# Patient Record
Sex: MALE | Race: WHITE | NOT HISPANIC OR LATINO | Employment: FULL TIME | URBAN - METROPOLITAN AREA
[De-identification: names, ages, dates, MRNs, and addresses within clinical notes are randomized per-mention and may not be internally consistent; named-entity substitution may affect disease eponyms.]

---

## 2017-10-25 ENCOUNTER — OFFICE VISIT (OUTPATIENT)
Dept: URGENT CARE | Facility: PHYSICIAN GROUP | Age: 27
End: 2017-10-25
Payer: COMMERCIAL

## 2017-10-25 VITALS
SYSTOLIC BLOOD PRESSURE: 128 MMHG | HEART RATE: 80 BPM | DIASTOLIC BLOOD PRESSURE: 78 MMHG | HEIGHT: 75 IN | BODY MASS INDEX: 16.16 KG/M2 | WEIGHT: 130 LBS | TEMPERATURE: 98.7 F | OXYGEN SATURATION: 98 % | RESPIRATION RATE: 12 BRPM

## 2017-10-25 DIAGNOSIS — R21 RASH: ICD-10-CM

## 2017-10-25 DIAGNOSIS — L08.9 INFECTED ABRASION OF LEFT HAND, INITIAL ENCOUNTER: ICD-10-CM

## 2017-10-25 DIAGNOSIS — S60.512A INFECTED ABRASION OF LEFT HAND, INITIAL ENCOUNTER: ICD-10-CM
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
## 2017-10-25 PROCEDURE — 99214 OFFICE O/P EST MOD 30 MIN: CPT | Performed by: PHYSICIAN ASSISTANT

## 2017-10-25 RX ORDER — SULFAMETHOXAZOLE AND TRIMETHOPRIM 800; 160 MG/1; MG/1
1 TABLET ORAL 2 TIMES DAILY
Qty: 20 TAB | Refills: 0 | Status: SHIPPED | OUTPATIENT
Start: 2017-10-25 | End: 2018-10-17

## 2017-10-25 ASSESSMENT — ENCOUNTER SYMPTOMS: FEVER: 0

## 2017-10-25 NOTE — PATIENT INSTRUCTIONS
Rash  A rash is a change in the color or texture of the skin. There are many different types of rashes. You may have other problems that accompany your rash.  CAUSES   · Infections.  · Allergic reactions. This can include allergies to pets or foods.  · Certain medicines.  · Exposure to certain chemicals, soaps, or cosmetics.  · Heat.  · Exposure to poisonous plants.  · Tumors, both cancerous and noncancerous.  SYMPTOMS   · Redness.  · Scaly skin.  · Itchy skin.  · Dry or cracked skin.  · Bumps.  · Blisters.  · Pain.  DIAGNOSIS   Your caregiver may do a physical exam to determine what type of rash you have. A skin sample (biopsy) may be taken and examined under a microscope.  TREATMENT   Treatment depends on the type of rash you have. Your caregiver may prescribe certain medicines. For serious conditions, you may need to see a skin doctor (dermatologist).  HOME CARE INSTRUCTIONS   · Avoid the substance that caused your rash.  · Do not scratch your rash. This can cause infection.  · You may take cool baths to help stop itching.  · Only take over-the-counter or prescription medicines as directed by your caregiver.  · Keep all follow-up appointments as directed by your caregiver.  SEEK IMMEDIATE MEDICAL CARE IF:  · You have increasing pain, swelling, or redness.  · You have a fever.  · You have new or severe symptoms.  · You have body aches, diarrhea, or vomiting.  · Your rash is not better after 3 days.  MAKE SURE YOU:  · Understand these instructions.  · Will watch your condition.  · Will get help right away if you are not doing well or get worse.     This information is not intended to replace advice given to you by your health care provider. Make sure you discuss any questions you have with your health care provider.     Document Released: 12/08/2003 Document Revised: 01/08/2016 Document Reviewed: 10/01/2012  [employer] Interactive Patient Education ©2016 [employer]

## 2017-10-25 NOTE — PROGRESS NOTES
"Subjective:      Aristides Kang is a 27 y.o. male who presents with Hand Pain (left hand swelling x this AM )    PMH:  has no past medical history on file.  MEDS:   Current Outpatient Prescriptions:   •  hydrocodone-acetaminophen (NORCO) 5-325 MG TABS per tablet, Take 1-2 Tabs by mouth every 6 hours as needed., Disp: 15 Tab, Rfl: 0  ALLERGIES: No Known Allergies  SURGHX: History reviewed. No pertinent surgical history.  SOCHX:  reports that he has been smoking Cigarettes.  He has smoked for the past 4.00 years. He does not have any smokeless tobacco history on file. He reports that he drinks alcohol. He reports that he does not use drugs.  FH: Reviewed with patient/family. Not pertinent to this complaint.          PT co multiple insect bites to arms and legs, sparing his trunk.  PT states he noticed these itchy bumps that look like bites a few days ago.  He woke up this morning and his left hand is swollen and slightly red, not particularly painful, feels more full and tight than painful.  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   PT denies any other complaint.       Rash   This is a new problem. The current episode started in the past 7 days. The problem has been gradually worsening since onset. The affected locations include the left lower leg, right lower leg, left arm, right arm and left hand. The rash is characterized by redness and itchiness. He was exposed to an insect bite/sting. Pertinent negatives include no fever. Past treatments include nothing. The treatment provided no relief. There is no history of eczema.       Review of Systems   Constitutional: Negative for fever.   Skin: Positive for itching and rash.   All other systems reviewed and are negative.         Objective:     /78   Pulse 80   Temp 37.1 °C (98.7 °F)   Resp 12   Ht 1.905 m (6' 3\")   Wt 59 kg (130 lb)   SpO2 98%   BMI 16.25 kg/m²  "     Physical Exam   Constitutional: He is oriented to person, place, and time. He appears well-developed and well-nourished. No distress.   HENT:   Head: Normocephalic and atraumatic.   Nose: Nose normal.   Eyes: Conjunctivae and EOM are normal. Pupils are equal, round, and reactive to light.   Neck: Normal range of motion.   Cardiovascular: Normal rate and intact distal pulses.    Pulmonary/Chest: Effort normal.   Musculoskeletal: Normal range of motion.   Neurological: He is alert and oriented to person, place, and time.   Skin: Skin is warm and dry. Capillary refill takes less than 2 seconds. Rash noted.        Psychiatric: He has a normal mood and affect.   Nursing note and vitals reviewed.              Assessment/Plan:     1. Infected abrasion of left hand, initial encounter  sulfamethoxazole-trimethoprim (BACTRIM DS) 800-160 MG tablet    permethrin (ELIMITE) 1 % lotion   2. Multiple insect bites  sulfamethoxazole-trimethoprim (BACTRIM DS) 800-160 MG tablet    permethrin (ELIMITE) 1 % lotion   3. Rash  sulfamethoxazole-trimethoprim (BACTRIM DS) 800-160 MG tablet    permethrin (ELIMITE) 1 % lotion     PT rash seems consistent with pattern of scabies and they are extremely itchy, so will treat with permethrin , benadryl.      Hand infection appears to be infected skin abrasion/scrape rather than infected bite, will treat with bactrim.     PT should follow up with UC in 2 days for re-evaluation if symptoms have not improved.      Discussed red flags and reasons to return to UC or ED.  Pt and/or family verbalized understanding of diagnosis and follow up instructions and was given informational handout on diagnosis.  PT discharged.

## 2017-10-25 NOTE — LETTER
October 25, 2017         Patient: Aristides Kang   YOB: 1990   Date of Visit: 10/25/2017           To Whom it May Concern:    Aristides Kang was seen in my clinic on 10/25/2017. He may return to work on 10/26/2017.    If you have any questions or concerns, please don't hesitate to call.        Sincerely,           Winnie Clinton P.A.-C.  Electronically Signed

## 2018-07-01 ENCOUNTER — APPOINTMENT (OUTPATIENT)
Dept: RADIOLOGY | Facility: MEDICAL CENTER | Age: 28
DRG: 200 | End: 2018-07-01
Attending: EMERGENCY MEDICINE
Payer: COMMERCIAL

## 2018-07-01 ENCOUNTER — HOSPITAL ENCOUNTER (INPATIENT)
Facility: MEDICAL CENTER | Age: 28
LOS: 6 days | DRG: 200 | End: 2018-07-07
Attending: EMERGENCY MEDICINE | Admitting: SURGERY
Payer: COMMERCIAL

## 2018-07-01 DIAGNOSIS — S27.0XXA TRAUMATIC PNEUMOTHORAX, INITIAL ENCOUNTER: ICD-10-CM

## 2018-07-01 PROBLEM — Z78.9 NO CONTRAINDICATION TO DEEP VEIN THROMBOSIS (DVT) PROPHYLAXIS: Status: ACTIVE | Noted: 2018-07-01

## 2018-07-01 PROBLEM — T14.90XA TRAUMA: Status: ACTIVE | Noted: 2018-07-01

## 2018-07-01 PROBLEM — F10.929 ALCOHOL INTOXICATION (HCC): Status: ACTIVE | Noted: 2018-07-01

## 2018-07-01 PROBLEM — J98.4 PNEUMATOCELE OF LUNG: Status: ACTIVE | Noted: 2018-07-01

## 2018-07-01 PROBLEM — R74.8 ELEVATED LIVER ENZYMES: Status: ACTIVE | Noted: 2018-07-01

## 2018-07-01 PROBLEM — J93.9 BILATERAL PNEUMOTHORACES: Status: ACTIVE | Noted: 2018-07-01

## 2018-07-01 PROBLEM — J98.2 PNEUMOMEDIASTINUM (HCC): Status: ACTIVE | Noted: 2018-07-01

## 2018-07-01 LAB
ABO GROUP BLD: NORMAL
ALBUMIN SERPL BCP-MCNC: 4.8 G/DL (ref 3.2–4.9)
ALBUMIN/GLOB SERPL: 1.7 G/DL
ALP SERPL-CCNC: 39 U/L (ref 30–99)
ALT SERPL-CCNC: 78 U/L (ref 2–50)
ANION GAP SERPL CALC-SCNC: 11 MMOL/L (ref 0–11.9)
APTT PPP: 25.3 SEC (ref 24.7–36)
AST SERPL-CCNC: 120 U/L (ref 12–45)
BILIRUB SERPL-MCNC: 0.5 MG/DL (ref 0.1–1.5)
BLD GP AB SCN SERPL QL: NORMAL
BUN SERPL-MCNC: 8 MG/DL (ref 8–22)
CALCIUM SERPL-MCNC: 9.4 MG/DL (ref 8.5–10.5)
CHLORIDE SERPL-SCNC: 106 MMOL/L (ref 96–112)
CO2 SERPL-SCNC: 22 MMOL/L (ref 20–33)
CREAT SERPL-MCNC: 0.8 MG/DL (ref 0.5–1.4)
ERYTHROCYTE [DISTWIDTH] IN BLOOD BY AUTOMATED COUNT: 43.9 FL (ref 35.9–50)
ETHANOL BLD-MCNC: 0.21 G/DL
GLOBULIN SER CALC-MCNC: 2.8 G/DL (ref 1.9–3.5)
GLUCOSE SERPL-MCNC: 103 MG/DL (ref 65–99)
HCT VFR BLD AUTO: 47.6 % (ref 42–52)
HGB BLD-MCNC: 16.5 G/DL (ref 14–18)
INR PPP: 1.22 (ref 0.87–1.13)
MCH RBC QN AUTO: 32.5 PG (ref 27–33)
MCHC RBC AUTO-ENTMCNC: 34.7 G/DL (ref 33.7–35.3)
MCV RBC AUTO: 93.9 FL (ref 81.4–97.8)
PLATELET # BLD AUTO: 227 K/UL (ref 164–446)
PMV BLD AUTO: 9.1 FL (ref 9–12.9)
POTASSIUM SERPL-SCNC: 3.3 MMOL/L (ref 3.6–5.5)
PROT SERPL-MCNC: 7.6 G/DL (ref 6–8.2)
PROTHROMBIN TIME: 15.1 SEC (ref 12–14.6)
RBC # BLD AUTO: 5.07 M/UL (ref 4.7–6.1)
RH BLD: NORMAL
SODIUM SERPL-SCNC: 139 MMOL/L (ref 135–145)
WBC # BLD AUTO: 18.9 K/UL (ref 4.8–10.8)

## 2018-07-01 PROCEDURE — 85610 PROTHROMBIN TIME: CPT

## 2018-07-01 PROCEDURE — A9270 NON-COVERED ITEM OR SERVICE: HCPCS | Performed by: NURSE PRACTITIONER

## 2018-07-01 PROCEDURE — 70450 CT HEAD/BRAIN W/O DYE: CPT

## 2018-07-01 PROCEDURE — 86901 BLOOD TYPING SEROLOGIC RH(D): CPT

## 2018-07-01 PROCEDURE — 85730 THROMBOPLASTIN TIME PARTIAL: CPT

## 2018-07-01 PROCEDURE — 99285 EMERGENCY DEPT VISIT HI MDM: CPT

## 2018-07-01 PROCEDURE — 71045 X-RAY EXAM CHEST 1 VIEW: CPT

## 2018-07-01 PROCEDURE — 94667 MNPJ CHEST WALL 1ST: CPT

## 2018-07-01 PROCEDURE — 770006 HCHG ROOM/CARE - MED/SURG/GYN SEMI*

## 2018-07-01 PROCEDURE — 80053 COMPREHEN METABOLIC PANEL: CPT

## 2018-07-01 PROCEDURE — 700105 HCHG RX REV CODE 258: Performed by: NURSE PRACTITIONER

## 2018-07-01 PROCEDURE — 85027 COMPLETE CBC AUTOMATED: CPT

## 2018-07-01 PROCEDURE — 36415 COLL VENOUS BLD VENIPUNCTURE: CPT

## 2018-07-01 PROCEDURE — 72131 CT LUMBAR SPINE W/O DYE: CPT

## 2018-07-01 PROCEDURE — 305948 HCHG GREEN TRAUMA ACT PRE-NOTIFY NO CC

## 2018-07-01 PROCEDURE — 86900 BLOOD TYPING SEROLOGIC ABO: CPT

## 2018-07-01 PROCEDURE — 72128 CT CHEST SPINE W/O DYE: CPT

## 2018-07-01 PROCEDURE — 94760 N-INVAS EAR/PLS OXIMETRY 1: CPT

## 2018-07-01 PROCEDURE — 700117 HCHG RX CONTRAST REV CODE 255: Performed by: EMERGENCY MEDICINE

## 2018-07-01 PROCEDURE — 700112 HCHG RX REV CODE 229: Performed by: NURSE PRACTITIONER

## 2018-07-01 PROCEDURE — 72125 CT NECK SPINE W/O DYE: CPT

## 2018-07-01 PROCEDURE — 700101 HCHG RX REV CODE 250: Performed by: NURSE PRACTITIONER

## 2018-07-01 PROCEDURE — 700102 HCHG RX REV CODE 250 W/ 637 OVERRIDE(OP): Performed by: NURSE PRACTITIONER

## 2018-07-01 PROCEDURE — 86850 RBC ANTIBODY SCREEN: CPT

## 2018-07-01 PROCEDURE — 80307 DRUG TEST PRSMV CHEM ANLYZR: CPT

## 2018-07-01 PROCEDURE — 700111 HCHG RX REV CODE 636 W/ 250 OVERRIDE (IP): Performed by: NURSE PRACTITIONER

## 2018-07-01 PROCEDURE — 71260 CT THORAX DX C+: CPT

## 2018-07-01 RX ORDER — METAXALONE 800 MG/1
800 TABLET ORAL 3 TIMES DAILY PRN
Status: DISCONTINUED | OUTPATIENT
Start: 2018-07-01 | End: 2018-07-07 | Stop reason: HOSPADM

## 2018-07-01 RX ORDER — ENEMA 19; 7 G/133ML; G/133ML
1 ENEMA RECTAL
Status: DISCONTINUED | OUTPATIENT
Start: 2018-07-01 | End: 2018-07-07 | Stop reason: HOSPADM

## 2018-07-01 RX ORDER — ACETAMINOPHEN 325 MG/1
650 TABLET ORAL EVERY 4 HOURS PRN
Status: DISCONTINUED | OUTPATIENT
Start: 2018-07-01 | End: 2018-07-07 | Stop reason: HOSPADM

## 2018-07-01 RX ORDER — ACETAMINOPHEN 650 MG/1
650 SUPPOSITORY RECTAL EVERY 4 HOURS PRN
Status: DISCONTINUED | OUTPATIENT
Start: 2018-07-01 | End: 2018-07-07 | Stop reason: HOSPADM

## 2018-07-01 RX ORDER — AMOXICILLIN 250 MG
1 CAPSULE ORAL
Status: DISCONTINUED | OUTPATIENT
Start: 2018-07-01 | End: 2018-07-07 | Stop reason: HOSPADM

## 2018-07-01 RX ORDER — AMOXICILLIN 250 MG
1 CAPSULE ORAL NIGHTLY
Status: DISCONTINUED | OUTPATIENT
Start: 2018-07-01 | End: 2018-07-07 | Stop reason: HOSPADM

## 2018-07-01 RX ORDER — POLYETHYLENE GLYCOL 3350 17 G/17G
1 POWDER, FOR SOLUTION ORAL 2 TIMES DAILY
Status: DISCONTINUED | OUTPATIENT
Start: 2018-07-01 | End: 2018-07-07 | Stop reason: HOSPADM

## 2018-07-01 RX ORDER — OXYCODONE HYDROCHLORIDE 10 MG/1
10 TABLET ORAL
Status: DISCONTINUED | OUTPATIENT
Start: 2018-07-01 | End: 2018-07-02

## 2018-07-01 RX ORDER — BACITRACIN ZINC AND POLYMYXIN B SULFATE 500; 1000 [USP'U]/G; [USP'U]/G
OINTMENT TOPICAL 3 TIMES DAILY
Status: DISCONTINUED | OUTPATIENT
Start: 2018-07-01 | End: 2018-07-07 | Stop reason: HOSPADM

## 2018-07-01 RX ORDER — ONDANSETRON 2 MG/ML
4 INJECTION INTRAMUSCULAR; INTRAVENOUS EVERY 4 HOURS PRN
Status: DISCONTINUED | OUTPATIENT
Start: 2018-07-01 | End: 2018-07-07 | Stop reason: HOSPADM

## 2018-07-01 RX ORDER — OXYCODONE HYDROCHLORIDE 5 MG/1
5 TABLET ORAL
Status: DISCONTINUED | OUTPATIENT
Start: 2018-07-01 | End: 2018-07-07 | Stop reason: HOSPADM

## 2018-07-01 RX ORDER — CELECOXIB 200 MG/1
200 CAPSULE ORAL 2 TIMES DAILY WITH MEALS
Status: DISCONTINUED | OUTPATIENT
Start: 2018-07-01 | End: 2018-07-06

## 2018-07-01 RX ORDER — BISACODYL 10 MG
10 SUPPOSITORY, RECTAL RECTAL
Status: DISCONTINUED | OUTPATIENT
Start: 2018-07-01 | End: 2018-07-07 | Stop reason: HOSPADM

## 2018-07-01 RX ORDER — SODIUM CHLORIDE 9 MG/ML
INJECTION, SOLUTION INTRAVENOUS CONTINUOUS
Status: DISCONTINUED | OUTPATIENT
Start: 2018-07-01 | End: 2018-07-02

## 2018-07-01 RX ORDER — DOCUSATE SODIUM 100 MG/1
100 CAPSULE, LIQUID FILLED ORAL 2 TIMES DAILY
Status: DISCONTINUED | OUTPATIENT
Start: 2018-07-01 | End: 2018-07-07 | Stop reason: HOSPADM

## 2018-07-01 RX ORDER — GABAPENTIN 100 MG/1
100 CAPSULE ORAL 3 TIMES DAILY
Status: DISCONTINUED | OUTPATIENT
Start: 2018-07-01 | End: 2018-07-06

## 2018-07-01 RX ORDER — ACETAMINOPHEN 500 MG
1000 TABLET ORAL EVERY 6 HOURS
Status: DISPENSED | OUTPATIENT
Start: 2018-07-02 | End: 2018-07-06

## 2018-07-01 RX ORDER — MORPHINE SULFATE 4 MG/ML
1-4 INJECTION, SOLUTION INTRAMUSCULAR; INTRAVENOUS
Status: DISCONTINUED | OUTPATIENT
Start: 2018-07-01 | End: 2018-07-06

## 2018-07-01 RX ADMIN — DOCUSATE SODIUM 100 MG: 100 CAPSULE ORAL at 21:38

## 2018-07-01 RX ADMIN — IOHEXOL 100 ML: 350 INJECTION, SOLUTION INTRAVENOUS at 20:05

## 2018-07-01 RX ADMIN — SODIUM CHLORIDE: 9 INJECTION, SOLUTION INTRAVENOUS at 21:42

## 2018-07-01 RX ADMIN — CELECOXIB 200 MG: 200 CAPSULE ORAL at 23:06

## 2018-07-01 RX ADMIN — OXYCODONE HYDROCHLORIDE 10 MG: 5 TABLET ORAL at 21:38

## 2018-07-01 RX ADMIN — MORPHINE SULFATE 4 MG: 4 INJECTION INTRAVENOUS at 23:07

## 2018-07-01 RX ADMIN — Medication 1 EACH: at 23:07

## 2018-07-01 RX ADMIN — ACETAMINOPHEN 1000 MG: 500 TABLET, FILM COATED ORAL at 23:06

## 2018-07-01 RX ADMIN — GABAPENTIN 100 MG: 100 CAPSULE ORAL at 21:38

## 2018-07-01 ASSESSMENT — COGNITIVE AND FUNCTIONAL STATUS - GENERAL
SUGGESTED CMS G CODE MODIFIER MOBILITY: CH
SUGGESTED CMS G CODE MODIFIER DAILY ACTIVITY: CH
MOBILITY SCORE: 24
DAILY ACTIVITIY SCORE: 24

## 2018-07-01 ASSESSMENT — COPD QUESTIONNAIRES
HAVE YOU SMOKED AT LEAST 100 CIGARETTES IN YOUR ENTIRE LIFE: NO/DON'T KNOW
DURING THE PAST 4 WEEKS HOW MUCH DID YOU FEEL SHORT OF BREATH: NONE/LITTLE OF THE TIME
DO YOU EVER COUGH UP ANY MUCUS OR PHLEGM?: NO/ONLY WITH OCCASIONAL COLDS OR INFECTIONS
COPD SCREENING SCORE: 0

## 2018-07-01 ASSESSMENT — PAIN SCALES - GENERAL
PAINLEVEL_OUTOF10: 9
PAINLEVEL_OUTOF10: 8
PAINLEVEL_OUTOF10: 4

## 2018-07-01 ASSESSMENT — PATIENT HEALTH QUESTIONNAIRE - PHQ9
2. FEELING DOWN, DEPRESSED, IRRITABLE, OR HOPELESS: NOT AT ALL
1. LITTLE INTEREST OR PLEASURE IN DOING THINGS: NOT AT ALL
SUM OF ALL RESPONSES TO PHQ9 QUESTIONS 1 AND 2: 0

## 2018-07-01 ASSESSMENT — LIFESTYLE VARIABLES
ALCOHOL_USE: NO
EVER_SMOKED: YES
EVER_SMOKED: YES

## 2018-07-02 ENCOUNTER — APPOINTMENT (OUTPATIENT)
Dept: RADIOLOGY | Facility: MEDICAL CENTER | Age: 28
DRG: 200 | End: 2018-07-02
Attending: NURSE PRACTITIONER
Payer: COMMERCIAL

## 2018-07-02 LAB
ABO GROUP BLD: NORMAL
ALBUMIN SERPL BCP-MCNC: 3.7 G/DL (ref 3.2–4.9)
ALBUMIN/GLOB SERPL: 1.3 G/DL
ALP SERPL-CCNC: 34 U/L (ref 30–99)
ALT SERPL-CCNC: 55 U/L (ref 2–50)
ANION GAP SERPL CALC-SCNC: 10 MMOL/L (ref 0–11.9)
AST SERPL-CCNC: 75 U/L (ref 12–45)
BASOPHILS # BLD AUTO: 0.1 % (ref 0–1.8)
BASOPHILS # BLD: 0.01 K/UL (ref 0–0.12)
BILIRUB SERPL-MCNC: 0.5 MG/DL (ref 0.1–1.5)
BUN SERPL-MCNC: 8 MG/DL (ref 8–22)
CALCIUM SERPL-MCNC: 9.1 MG/DL (ref 8.5–10.5)
CHLORIDE SERPL-SCNC: 108 MMOL/L (ref 96–112)
CO2 SERPL-SCNC: 20 MMOL/L (ref 20–33)
CREAT SERPL-MCNC: 0.72 MG/DL (ref 0.5–1.4)
EOSINOPHIL # BLD AUTO: 0.08 K/UL (ref 0–0.51)
EOSINOPHIL NFR BLD: 0.7 % (ref 0–6.9)
ERYTHROCYTE [DISTWIDTH] IN BLOOD BY AUTOMATED COUNT: 43.3 FL (ref 35.9–50)
GLOBULIN SER CALC-MCNC: 2.8 G/DL (ref 1.9–3.5)
GLUCOSE SERPL-MCNC: 110 MG/DL (ref 65–99)
HCT VFR BLD AUTO: 41.7 % (ref 42–52)
HGB BLD-MCNC: 14.4 G/DL (ref 14–18)
IMM GRANULOCYTES # BLD AUTO: 0.04 K/UL (ref 0–0.11)
IMM GRANULOCYTES NFR BLD AUTO: 0.3 % (ref 0–0.9)
INR PPP: 1.22 (ref 0.87–1.13)
LYMPHOCYTES # BLD AUTO: 2.38 K/UL (ref 1–4.8)
LYMPHOCYTES NFR BLD: 19.9 % (ref 22–41)
MAGNESIUM SERPL-MCNC: 2 MG/DL (ref 1.5–2.5)
MCH RBC QN AUTO: 32.3 PG (ref 27–33)
MCHC RBC AUTO-ENTMCNC: 34.5 G/DL (ref 33.7–35.3)
MCV RBC AUTO: 93.5 FL (ref 81.4–97.8)
MONOCYTES # BLD AUTO: 0.9 K/UL (ref 0–0.85)
MONOCYTES NFR BLD AUTO: 7.5 % (ref 0–13.4)
NEUTROPHILS # BLD AUTO: 8.52 K/UL (ref 1.82–7.42)
NEUTROPHILS NFR BLD: 71.5 % (ref 44–72)
NRBC # BLD AUTO: 0 K/UL
NRBC BLD-RTO: 0 /100 WBC
PHOSPHATE SERPL-MCNC: 4.9 MG/DL (ref 2.5–4.5)
PLATELET # BLD AUTO: 203 K/UL (ref 164–446)
PMV BLD AUTO: 9.3 FL (ref 9–12.9)
POTASSIUM SERPL-SCNC: 3.8 MMOL/L (ref 3.6–5.5)
PROT SERPL-MCNC: 6.5 G/DL (ref 6–8.2)
PROTHROMBIN TIME: 15.1 SEC (ref 12–14.6)
RBC # BLD AUTO: 4.46 M/UL (ref 4.7–6.1)
RH BLD: NORMAL
SODIUM SERPL-SCNC: 138 MMOL/L (ref 135–145)
WBC # BLD AUTO: 11.9 K/UL (ref 4.8–10.8)

## 2018-07-02 PROCEDURE — 36415 COLL VENOUS BLD VENIPUNCTURE: CPT

## 2018-07-02 PROCEDURE — 700111 HCHG RX REV CODE 636 W/ 250 OVERRIDE (IP): Performed by: NURSE PRACTITIONER

## 2018-07-02 PROCEDURE — A9270 NON-COVERED ITEM OR SERVICE: HCPCS | Performed by: NURSE PRACTITIONER

## 2018-07-02 PROCEDURE — G8987 SELF CARE CURRENT STATUS: HCPCS | Mod: CI

## 2018-07-02 PROCEDURE — 83735 ASSAY OF MAGNESIUM: CPT

## 2018-07-02 PROCEDURE — 84100 ASSAY OF PHOSPHORUS: CPT

## 2018-07-02 PROCEDURE — 85610 PROTHROMBIN TIME: CPT

## 2018-07-02 PROCEDURE — G8989 SELF CARE D/C STATUS: HCPCS | Mod: CI

## 2018-07-02 PROCEDURE — 85025 COMPLETE CBC W/AUTO DIFF WBC: CPT

## 2018-07-02 PROCEDURE — 700112 HCHG RX REV CODE 229: Performed by: NURSE PRACTITIONER

## 2018-07-02 PROCEDURE — 700101 HCHG RX REV CODE 250: Performed by: NURSE PRACTITIONER

## 2018-07-02 PROCEDURE — 71045 X-RAY EXAM CHEST 1 VIEW: CPT

## 2018-07-02 PROCEDURE — 80053 COMPREHEN METABOLIC PANEL: CPT

## 2018-07-02 PROCEDURE — G8988 SELF CARE GOAL STATUS: HCPCS | Mod: CI

## 2018-07-02 PROCEDURE — 97165 OT EVAL LOW COMPLEX 30 MIN: CPT

## 2018-07-02 PROCEDURE — 770006 HCHG ROOM/CARE - MED/SURG/GYN SEMI*

## 2018-07-02 PROCEDURE — 700102 HCHG RX REV CODE 250 W/ 637 OVERRIDE(OP): Performed by: NURSE PRACTITIONER

## 2018-07-02 RX ADMIN — ACETAMINOPHEN 1000 MG: 500 TABLET, FILM COATED ORAL at 05:52

## 2018-07-02 RX ADMIN — ENOXAPARIN SODIUM 30 MG: 100 INJECTION SUBCUTANEOUS at 21:48

## 2018-07-02 RX ADMIN — ACETAMINOPHEN 1000 MG: 500 TABLET, FILM COATED ORAL at 19:06

## 2018-07-02 RX ADMIN — CELECOXIB 200 MG: 200 CAPSULE ORAL at 16:39

## 2018-07-02 RX ADMIN — GABAPENTIN 100 MG: 100 CAPSULE ORAL at 16:39

## 2018-07-02 RX ADMIN — GABAPENTIN 100 MG: 100 CAPSULE ORAL at 21:48

## 2018-07-02 RX ADMIN — GABAPENTIN 100 MG: 100 CAPSULE ORAL at 08:47

## 2018-07-02 RX ADMIN — DOCUSATE SODIUM 100 MG: 100 CAPSULE ORAL at 08:47

## 2018-07-02 RX ADMIN — ENOXAPARIN SODIUM 30 MG: 100 INJECTION SUBCUTANEOUS at 08:48

## 2018-07-02 RX ADMIN — Medication 1 EACH: at 21:48

## 2018-07-02 RX ADMIN — MAGNESIUM HYDROXIDE 30 ML: 400 SUSPENSION ORAL at 08:48

## 2018-07-02 RX ADMIN — ACETAMINOPHEN 1000 MG: 500 TABLET, FILM COATED ORAL at 13:34

## 2018-07-02 RX ADMIN — POLYETHYLENE GLYCOL 3350 1 PACKET: 17 POWDER, FOR SOLUTION ORAL at 08:48

## 2018-07-02 RX ADMIN — Medication 1 EACH: at 08:48

## 2018-07-02 RX ADMIN — CELECOXIB 200 MG: 200 CAPSULE ORAL at 08:47

## 2018-07-02 RX ADMIN — Medication 1 EACH: at 16:39

## 2018-07-02 ASSESSMENT — PAIN SCALES - GENERAL
PAINLEVEL_OUTOF10: 5
PAINLEVEL_OUTOF10: 2
PAINLEVEL_OUTOF10: 4
PAINLEVEL_OUTOF10: 3

## 2018-07-02 ASSESSMENT — COGNITIVE AND FUNCTIONAL STATUS - GENERAL
DAILY ACTIVITIY SCORE: 24
SUGGESTED CMS G CODE MODIFIER DAILY ACTIVITY: CH

## 2018-07-02 ASSESSMENT — ENCOUNTER SYMPTOMS
NAUSEA: 0
PALPITATIONS: 0
HEADACHES: 0
LOSS OF CONSCIOUSNESS: 0
DIZZINESS: 0
WEAKNESS: 0
BACK PAIN: 0
FEVER: 0
DIAPHORESIS: 0
EYE PAIN: 0
MYALGIAS: 0
FOCAL WEAKNESS: 0
EYE DISCHARGE: 0
NECK PAIN: 0
BLURRED VISION: 0
CHILLS: 0
DEPRESSION: 0
ABDOMINAL PAIN: 0
EYE REDNESS: 0
VOMITING: 0
SHORTNESS OF BREATH: 0
SEIZURES: 0
PHOTOPHOBIA: 0
DOUBLE VISION: 0
SPUTUM PRODUCTION: 0

## 2018-07-02 ASSESSMENT — PATIENT HEALTH QUESTIONNAIRE - PHQ9
1. LITTLE INTEREST OR PLEASURE IN DOING THINGS: NOT AT ALL
SUM OF ALL RESPONSES TO PHQ9 QUESTIONS 1 AND 2: 0
2. FEELING DOWN, DEPRESSED, IRRITABLE, OR HOPELESS: NOT AT ALL

## 2018-07-02 ASSESSMENT — ACTIVITIES OF DAILY LIVING (ADL): TOILETING: INDEPENDENT
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
## 2018-07-02 NOTE — CARE PLAN
Problem: Pain Management  Goal: Pain level will decrease to patient's comfort goal    Intervention: Follow pain managment plan developed in collaboration with patient and Interdisciplinary Team  Oxycodone as needed for pain, no complaints at this time      Problem: Respiratory:  Goal: Respiratory status will improve  room air
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
## 2018-07-02 NOTE — PROGRESS NOTES
Received report from ED and assumed care at 2245.    Pt is A&O x4.  Pain 8/10, medicated per MAR  Denies nausea  Tolerating regular diet  + Urine output  + BM pta  + Flatus  Up with standby assistance  Family at bedside  Bed in lowest position and locked.  Pt resting comfortably now.  Review plan of care with patient  Call light within reach  Hourly rounds in place  All needs met at this time

## 2018-07-02 NOTE — PROGRESS NOTES
Patient alert and oriented, sitting up in bed eating breakfast  No complaints of pain at this time, just a little discomfort, denies need for pain meds  Iv patent to left ac  hrr, no edema, pulses 2+  Lungs clear, room air, cont ox in place, encouraged use of IS  Bs+, no n/v, +flatus, +bm yesterday  Voiding q.s   Tolerating diet   VSS

## 2018-07-02 NOTE — CARE PLAN
Problem: Pain Management  Goal: Pain level will decrease to patient's comfort goal  Outcome: PROGRESSING AS EXPECTED  Pain being managed with PRN and scheduled medications.    Problem: Respiratory:  Goal: Respiratory status will improve  Outcome: PROGRESSING AS EXPECTED  Pt demonstrates appropriate use of IS.

## 2018-07-02 NOTE — ED NOTES
Patients fiance called. Message left to return call.   Brother updated on plan for admission.   Tammie Rees [phone]  Ger[place] -brother [phone]
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
## 2018-07-03 ENCOUNTER — APPOINTMENT (OUTPATIENT)
Dept: RADIOLOGY | Facility: MEDICAL CENTER | Age: 28
DRG: 200 | End: 2018-07-03
Attending: RADIOLOGY
Payer: COMMERCIAL

## 2018-07-03 ENCOUNTER — APPOINTMENT (OUTPATIENT)
Dept: RADIOLOGY | Facility: MEDICAL CENTER | Age: 28
DRG: 200 | End: 2018-07-03
Attending: NURSE PRACTITIONER
Payer: COMMERCIAL

## 2018-07-03 PROCEDURE — 71045 X-RAY EXAM CHEST 1 VIEW: CPT

## 2018-07-03 PROCEDURE — 700101 HCHG RX REV CODE 250: Performed by: NURSE PRACTITIONER

## 2018-07-03 PROCEDURE — 0W9B30Z DRAINAGE OF LEFT PLEURAL CAVITY WITH DRAINAGE DEVICE, PERCUTANEOUS APPROACH: ICD-10-PCS | Performed by: RADIOLOGY

## 2018-07-03 PROCEDURE — 700111 HCHG RX REV CODE 636 W/ 250 OVERRIDE (IP)

## 2018-07-03 PROCEDURE — 770006 HCHG ROOM/CARE - MED/SURG/GYN SEMI*

## 2018-07-03 PROCEDURE — 700111 HCHG RX REV CODE 636 W/ 250 OVERRIDE (IP): Performed by: NURSE PRACTITIONER

## 2018-07-03 PROCEDURE — 700111 HCHG RX REV CODE 636 W/ 250 OVERRIDE (IP): Performed by: RADIOLOGY

## 2018-07-03 PROCEDURE — 700112 HCHG RX REV CODE 229: Performed by: NURSE PRACTITIONER

## 2018-07-03 PROCEDURE — A9270 NON-COVERED ITEM OR SERVICE: HCPCS | Performed by: NURSE PRACTITIONER

## 2018-07-03 PROCEDURE — 700102 HCHG RX REV CODE 250 W/ 637 OVERRIDE(OP): Performed by: NURSE PRACTITIONER

## 2018-07-03 PROCEDURE — 99153 MOD SED SAME PHYS/QHP EA: CPT

## 2018-07-03 RX ORDER — SODIUM CHLORIDE 9 MG/ML
500 INJECTION, SOLUTION INTRAVENOUS
Status: ACTIVE | OUTPATIENT
Start: 2018-07-03 | End: 2018-07-03

## 2018-07-03 RX ORDER — MIDAZOLAM HYDROCHLORIDE 1 MG/ML
.5-2 INJECTION INTRAMUSCULAR; INTRAVENOUS PRN
Status: ACTIVE | OUTPATIENT
Start: 2018-07-03 | End: 2018-07-03

## 2018-07-03 RX ORDER — ONDANSETRON 2 MG/ML
4 INJECTION INTRAMUSCULAR; INTRAVENOUS PRN
Status: ACTIVE | OUTPATIENT
Start: 2018-07-03 | End: 2018-07-03

## 2018-07-03 RX ORDER — NALOXONE HYDROCHLORIDE 0.4 MG/ML
INJECTION, SOLUTION INTRAMUSCULAR; INTRAVENOUS; SUBCUTANEOUS
Status: COMPLETED
Start: 2018-07-03 | End: 2018-07-03

## 2018-07-03 RX ORDER — MIDAZOLAM HYDROCHLORIDE 1 MG/ML
INJECTION INTRAMUSCULAR; INTRAVENOUS
Status: COMPLETED
Start: 2018-07-03 | End: 2018-07-03

## 2018-07-03 RX ADMIN — FENTANYL CITRATE 50 MCG: 50 INJECTION, SOLUTION INTRAMUSCULAR; INTRAVENOUS at 18:08

## 2018-07-03 RX ADMIN — CELECOXIB 200 MG: 200 CAPSULE ORAL at 08:17

## 2018-07-03 RX ADMIN — GABAPENTIN 100 MG: 100 CAPSULE ORAL at 08:17

## 2018-07-03 RX ADMIN — GABAPENTIN 100 MG: 100 CAPSULE ORAL at 21:45

## 2018-07-03 RX ADMIN — POLYETHYLENE GLYCOL 3350 1 PACKET: 17 POWDER, FOR SOLUTION ORAL at 08:17

## 2018-07-03 RX ADMIN — LIDOCAINE HYDROCHLORIDE 20 MG: 20 INJECTION, SOLUTION INTRAVENOUS at 17:30

## 2018-07-03 RX ADMIN — ACETAMINOPHEN 1000 MG: 500 TABLET, FILM COATED ORAL at 12:26

## 2018-07-03 RX ADMIN — DOCUSATE SODIUM 100 MG: 100 CAPSULE ORAL at 08:17

## 2018-07-03 RX ADMIN — MIDAZOLAM 2 MG: 1 INJECTION INTRAMUSCULAR; INTRAVENOUS at 18:00

## 2018-07-03 RX ADMIN — Medication 1 EACH: at 08:17

## 2018-07-03 RX ADMIN — FENTANYL CITRATE 25 MCG: 50 INJECTION, SOLUTION INTRAMUSCULAR; INTRAVENOUS at 18:05

## 2018-07-03 RX ADMIN — ENOXAPARIN SODIUM 30 MG: 100 INJECTION SUBCUTANEOUS at 08:17

## 2018-07-03 RX ADMIN — ACETAMINOPHEN 1000 MG: 500 TABLET, FILM COATED ORAL at 01:05

## 2018-07-03 RX ADMIN — MAGNESIUM HYDROXIDE 30 ML: 400 SUSPENSION ORAL at 08:17

## 2018-07-03 RX ADMIN — MIDAZOLAM 2 MG: 1 INJECTION INTRAMUSCULAR; INTRAVENOUS at 18:05

## 2018-07-03 RX ADMIN — OXYCODONE HYDROCHLORIDE 5 MG: 5 TABLET ORAL at 20:14

## 2018-07-03 RX ADMIN — ACETAMINOPHEN 1000 MG: 500 TABLET, FILM COATED ORAL at 06:05

## 2018-07-03 ASSESSMENT — ENCOUNTER SYMPTOMS
ABDOMINAL PAIN: 0
VOMITING: 0
MYALGIAS: 1
HEADACHES: 0
NAUSEA: 0
COUGH: 0
FEVER: 0
DIZZINESS: 0
SHORTNESS OF BREATH: 0
DOUBLE VISION: 0
PALPITATIONS: 0

## 2018-07-03 ASSESSMENT — PAIN SCALES - GENERAL
PAINLEVEL_OUTOF10: 0
PAINLEVEL_OUTOF10: 2
PAINLEVEL_OUTOF10: 7
PAINLEVEL_OUTOF10: 5
PAINLEVEL_OUTOF10: 0
PAINLEVEL_OUTOF10: 0

## 2018-07-03 NOTE — THERAPY
consult received; spoke with nsg, 6 clicks of 24 mobility score; observed exiting bed and up self in hallways independently ; pt screened and has no acute PT needs; please reconsult if pt has functional change in status; noted OT visit of 1x/only

## 2018-07-03 NOTE — PROGRESS NOTES
Received bedside report and assumed care at 1900.    Pt is A&O x4.  Pain 2/10, declines intervention.  Denies nausea  Tolerating regular diet  + Urine output  + BM  + Flatus  Up with standby assistance  Bed in lowest position and locked.  Pt resting comfortably now.  Review plan of care with patient  Call light within reach  Hourly rounds in place  All needs met at this time
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
## 2018-07-04 ENCOUNTER — APPOINTMENT (OUTPATIENT)
Dept: RADIOLOGY | Facility: MEDICAL CENTER | Age: 28
DRG: 200 | End: 2018-07-04
Attending: NURSE PRACTITIONER
Payer: COMMERCIAL

## 2018-07-04 PROCEDURE — 700102 HCHG RX REV CODE 250 W/ 637 OVERRIDE(OP): Performed by: NURSE PRACTITIONER

## 2018-07-04 PROCEDURE — 71045 X-RAY EXAM CHEST 1 VIEW: CPT

## 2018-07-04 PROCEDURE — A9270 NON-COVERED ITEM OR SERVICE: HCPCS | Performed by: NURSE PRACTITIONER

## 2018-07-04 PROCEDURE — 700111 HCHG RX REV CODE 636 W/ 250 OVERRIDE (IP): Performed by: NURSE PRACTITIONER

## 2018-07-04 PROCEDURE — 770006 HCHG ROOM/CARE - MED/SURG/GYN SEMI*

## 2018-07-04 PROCEDURE — 700112 HCHG RX REV CODE 229: Performed by: NURSE PRACTITIONER

## 2018-07-04 PROCEDURE — 700101 HCHG RX REV CODE 250: Performed by: NURSE PRACTITIONER

## 2018-07-04 RX ADMIN — POLYETHYLENE GLYCOL 3350 1 PACKET: 17 POWDER, FOR SOLUTION ORAL at 08:31

## 2018-07-04 RX ADMIN — ENOXAPARIN SODIUM 30 MG: 100 INJECTION SUBCUTANEOUS at 21:30

## 2018-07-04 RX ADMIN — Medication 1 EACH: at 15:18

## 2018-07-04 RX ADMIN — CELECOXIB 200 MG: 200 CAPSULE ORAL at 18:00

## 2018-07-04 RX ADMIN — OXYCODONE HYDROCHLORIDE 5 MG: 5 TABLET ORAL at 00:04

## 2018-07-04 RX ADMIN — GABAPENTIN 100 MG: 100 CAPSULE ORAL at 15:18

## 2018-07-04 RX ADMIN — ENOXAPARIN SODIUM 30 MG: 100 INJECTION SUBCUTANEOUS at 10:43

## 2018-07-04 RX ADMIN — CELECOXIB 200 MG: 200 CAPSULE ORAL at 08:31

## 2018-07-04 RX ADMIN — MAGNESIUM HYDROXIDE 30 ML: 400 SUSPENSION ORAL at 08:31

## 2018-07-04 RX ADMIN — GABAPENTIN 100 MG: 100 CAPSULE ORAL at 08:31

## 2018-07-04 RX ADMIN — DOCUSATE SODIUM 100 MG: 100 CAPSULE ORAL at 08:31

## 2018-07-04 RX ADMIN — GABAPENTIN 100 MG: 100 CAPSULE ORAL at 21:30

## 2018-07-04 RX ADMIN — Medication 1 EACH: at 08:31

## 2018-07-04 ASSESSMENT — ENCOUNTER SYMPTOMS
NECK PAIN: 0
SHORTNESS OF BREATH: 0
DOUBLE VISION: 0
MYALGIAS: 1
FEVER: 0
BACK PAIN: 0
VOMITING: 0
NAUSEA: 0
HEADACHES: 0
CHILLS: 0
ABDOMINAL PAIN: 0

## 2018-07-04 ASSESSMENT — PAIN SCALES - GENERAL
PAINLEVEL_OUTOF10: 4
PAINLEVEL_OUTOF10: 0
PAINLEVEL_OUTOF10: 2
PAINLEVEL_OUTOF10: 4
PAINLEVEL_OUTOF10: 6

## 2018-07-04 NOTE — CARE PLAN
Problem: Venous Thromboembolism (VTW)/Deep Vein Thrombosis (DVT) Prevention:  Goal: Patient will participate in Venous Thrombosis (VTE)/Deep Vein Thrombosis (DVT)Prevention Measures  Lovenox injection administered per MAR.     Problem: Pain Management  Goal: Pain level will decrease to patient's comfort goal  Patient denies pain. Scheduled pain meds administered.     Problem: Mobility  Goal: Risk for activity intolerance will decrease  Pt ambulating in the michele independently.

## 2018-07-04 NOTE — CARE PLAN
Problem: Pain Management  Goal: Pain level will decrease to patient's comfort goal  Pt c/o mild discomfort. Declines intervention.     Problem: Mobility  Goal: Risk for activity intolerance will decrease  Pt ambulating independently. Tolerates ambulation.

## 2018-07-04 NOTE — PROGRESS NOTES
0645: Report received from night shift. Pt lying in bed after returning from a walk in the halls. Pt AAO x4. C/o mild discomfort, but declines intervention. Denies needs. Pt on continuous pulse ox. SpO2 >90% on RA. Chest tube connections assessed. Dressing clean, dry, and intact. Small amount of bloody drainage noted in chest tube canister. Chart reviewed.

## 2018-07-04 NOTE — PROGRESS NOTES
IR RN note:    Site Marked and Confirmed with MD, patient and RN pre procedure   Left chest tube placement by MD Rodriguez assisted by RT Brent,Left chest access site;  Chest tube placed   BS Flexcharles APDL Locking pigtail drainage catheter 8 Fr x 25 cm REF# [id number] LOT# [id number]   Dry suction in place  End Tidal CO2 range 33-40 during procedure   Patient tolerated procedure, hemodynamically stable; pt awake and talking post procedure; report given to WILMER Sanford ;   patient transported back to room via IR RN monitored then transferred care to report RN

## 2018-07-04 NOTE — CARE PLAN
Problem: Communication  Goal: The ability to communicate needs accurately and effectively will improve  Outcome: PROGRESSING AS EXPECTED  Pt demonstrates appropriate use of call light. POC discussed.    Problem: Knowledge Deficit  Goal: Knowledge of disease process/condition, treatment plan, diagnostic tests, and medications will improve  Outcome: PROGRESSING AS EXPECTED  Pt and family had questions about chest tube. Questions answered.

## 2018-07-04 NOTE — OR SURGEON
Immediate Post- Operative Note        PostOp Diagnosis:left pneumothrax    Procedure(s):Ct guided 8 Fr pigtail chest tube placement      Estimated Blood Loss: Less than 5 ml        Complications: None            7/3/2018     6:15 PM     Gaudencio Rodriguez

## 2018-07-04 NOTE — PROGRESS NOTES
"Blood pressure 118/82, pulse 71, temperature 36.4 °C (97.5 °F), resp. rate 17, height 1.88 m (6' 2\"), weight 68.2 kg (150 lb 5.7 oz), SpO2 98 %.    7/4 CXR 1316  Improving left, stable right small apical pneumothoraces  Left decreased and right no increase in previous size.     "

## 2018-07-04 NOTE — PROGRESS NOTES
1720: Pt off unit for chest tube placement.     1819: Pt returning to floor soon. Received pain meds during chest tube placement. 1730 Celebrex not administered prior to pt leaving floor. Retimed for 2100 to prevent any adverse effects.

## 2018-07-04 NOTE — CARE PLAN
Problem: Respiratory:  Goal: Respiratory status will improve  Chest tube in place. No crepitus noted. Chest tube connections all attached appropriately. Pt SpO2 >95% on RA. Denies SOB or respiratory issue.

## 2018-07-04 NOTE — PROGRESS NOTES
Received bedside report and assumed care at 1900.    Pt is A&O x4.  Pain 7/10. Medicated per MAR  Denies nausea  Tolerating regular diet  Chest tube to left side set to water seal. Dressing CDI  + Urine output  + BM  + Flatus  Up with standby assistance  Bed in lowest position and locked.  Pt resting comfortably now.  Review plan of care with patient  Call light within reach  Hourly rounds in place  All needs met at this time
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
## 2018-07-05 ENCOUNTER — APPOINTMENT (OUTPATIENT)
Dept: RADIOLOGY | Facility: MEDICAL CENTER | Age: 28
DRG: 200 | End: 2018-07-05
Attending: NURSE PRACTITIONER
Payer: COMMERCIAL

## 2018-07-05 LAB
ALBUMIN SERPL BCP-MCNC: 4.1 G/DL (ref 3.2–4.9)
ALBUMIN/GLOB SERPL: 1.5 G/DL
ALP SERPL-CCNC: 42 U/L (ref 30–99)
ALT SERPL-CCNC: 47 U/L (ref 2–50)
ANION GAP SERPL CALC-SCNC: 9 MMOL/L (ref 0–11.9)
AST SERPL-CCNC: 38 U/L (ref 12–45)
BASOPHILS # BLD AUTO: 0.2 % (ref 0–1.8)
BASOPHILS # BLD: 0.01 K/UL (ref 0–0.12)
BILIRUB SERPL-MCNC: 0.7 MG/DL (ref 0.1–1.5)
BUN SERPL-MCNC: 14 MG/DL (ref 8–22)
CALCIUM SERPL-MCNC: 9.6 MG/DL (ref 8.5–10.5)
CHLORIDE SERPL-SCNC: 102 MMOL/L (ref 96–112)
CO2 SERPL-SCNC: 26 MMOL/L (ref 20–33)
CREAT SERPL-MCNC: 0.82 MG/DL (ref 0.5–1.4)
EOSINOPHIL # BLD AUTO: 0.25 K/UL (ref 0–0.51)
EOSINOPHIL NFR BLD: 4.4 % (ref 0–6.9)
ERYTHROCYTE [DISTWIDTH] IN BLOOD BY AUTOMATED COUNT: 42.3 FL (ref 35.9–50)
GLOBULIN SER CALC-MCNC: 2.8 G/DL (ref 1.9–3.5)
GLUCOSE SERPL-MCNC: 100 MG/DL (ref 65–99)
HCT VFR BLD AUTO: 43.8 % (ref 42–52)
HGB BLD-MCNC: 15.5 G/DL (ref 14–18)
IMM GRANULOCYTES # BLD AUTO: 0.03 K/UL (ref 0–0.11)
IMM GRANULOCYTES NFR BLD AUTO: 0.5 % (ref 0–0.9)
LYMPHOCYTES # BLD AUTO: 1.71 K/UL (ref 1–4.8)
LYMPHOCYTES NFR BLD: 30.3 % (ref 22–41)
MAGNESIUM SERPL-MCNC: 2.2 MG/DL (ref 1.5–2.5)
MCH RBC QN AUTO: 33 PG (ref 27–33)
MCHC RBC AUTO-ENTMCNC: 35.4 G/DL (ref 33.7–35.3)
MCV RBC AUTO: 93.2 FL (ref 81.4–97.8)
MONOCYTES # BLD AUTO: 0.65 K/UL (ref 0–0.85)
MONOCYTES NFR BLD AUTO: 11.5 % (ref 0–13.4)
NEUTROPHILS # BLD AUTO: 3 K/UL (ref 1.82–7.42)
NEUTROPHILS NFR BLD: 53.1 % (ref 44–72)
NRBC # BLD AUTO: 0 K/UL
NRBC BLD-RTO: 0 /100 WBC
PHOSPHATE SERPL-MCNC: 4.8 MG/DL (ref 2.5–4.5)
PLATELET # BLD AUTO: 195 K/UL (ref 164–446)
PMV BLD AUTO: 9.5 FL (ref 9–12.9)
POTASSIUM SERPL-SCNC: 4.1 MMOL/L (ref 3.6–5.5)
PROT SERPL-MCNC: 6.9 G/DL (ref 6–8.2)
RBC # BLD AUTO: 4.7 M/UL (ref 4.7–6.1)
SODIUM SERPL-SCNC: 137 MMOL/L (ref 135–145)
WBC # BLD AUTO: 5.7 K/UL (ref 4.8–10.8)

## 2018-07-05 PROCEDURE — 700112 HCHG RX REV CODE 229: Performed by: NURSE PRACTITIONER

## 2018-07-05 PROCEDURE — 700101 HCHG RX REV CODE 250: Performed by: NURSE PRACTITIONER

## 2018-07-05 PROCEDURE — 700111 HCHG RX REV CODE 636 W/ 250 OVERRIDE (IP): Performed by: NURSE PRACTITIONER

## 2018-07-05 PROCEDURE — 770006 HCHG ROOM/CARE - MED/SURG/GYN SEMI*

## 2018-07-05 PROCEDURE — 83735 ASSAY OF MAGNESIUM: CPT

## 2018-07-05 PROCEDURE — 36415 COLL VENOUS BLD VENIPUNCTURE: CPT

## 2018-07-05 PROCEDURE — A9270 NON-COVERED ITEM OR SERVICE: HCPCS | Performed by: NURSE PRACTITIONER

## 2018-07-05 PROCEDURE — 85025 COMPLETE CBC W/AUTO DIFF WBC: CPT

## 2018-07-05 PROCEDURE — 84100 ASSAY OF PHOSPHORUS: CPT

## 2018-07-05 PROCEDURE — 71045 X-RAY EXAM CHEST 1 VIEW: CPT

## 2018-07-05 PROCEDURE — 700102 HCHG RX REV CODE 250 W/ 637 OVERRIDE(OP): Performed by: NURSE PRACTITIONER

## 2018-07-05 PROCEDURE — 80053 COMPREHEN METABOLIC PANEL: CPT

## 2018-07-05 RX ADMIN — ACETAMINOPHEN 1000 MG: 500 TABLET, FILM COATED ORAL at 23:21

## 2018-07-05 RX ADMIN — GABAPENTIN 100 MG: 100 CAPSULE ORAL at 14:42

## 2018-07-05 RX ADMIN — Medication 1 EACH: at 14:42

## 2018-07-05 RX ADMIN — CELECOXIB 200 MG: 200 CAPSULE ORAL at 17:25

## 2018-07-05 RX ADMIN — CELECOXIB 200 MG: 200 CAPSULE ORAL at 08:46

## 2018-07-05 RX ADMIN — Medication 1 EACH: at 08:46

## 2018-07-05 RX ADMIN — DOCUSATE SODIUM 100 MG: 100 CAPSULE ORAL at 21:27

## 2018-07-05 RX ADMIN — STANDARDIZED SENNA CONCENTRATE AND DOCUSATE SODIUM 1 TABLET: 8.6; 5 TABLET, FILM COATED ORAL at 21:27

## 2018-07-05 RX ADMIN — GABAPENTIN 100 MG: 100 CAPSULE ORAL at 21:27

## 2018-07-05 RX ADMIN — Medication 1 EACH: at 21:28

## 2018-07-05 RX ADMIN — ENOXAPARIN SODIUM 30 MG: 100 INJECTION SUBCUTANEOUS at 21:28

## 2018-07-05 RX ADMIN — ENOXAPARIN SODIUM 30 MG: 100 INJECTION SUBCUTANEOUS at 08:47

## 2018-07-05 RX ADMIN — GABAPENTIN 100 MG: 100 CAPSULE ORAL at 08:46

## 2018-07-05 ASSESSMENT — ENCOUNTER SYMPTOMS
NECK PAIN: 0
FEVER: 0
NAUSEA: 0
CHILLS: 0
HEADACHES: 0
DOUBLE VISION: 0
SHORTNESS OF BREATH: 0
BACK PAIN: 0
VOMITING: 0
MYALGIAS: 1
ABDOMINAL PAIN: 0

## 2018-07-05 ASSESSMENT — PAIN SCALES - GENERAL
PAINLEVEL_OUTOF10: 0

## 2018-07-05 NOTE — CARE PLAN
Problem: Respiratory:  Goal: Respiratory status will improve  Outcome: PROGRESSING AS EXPECTED  Pt not having any difficulties breathing. On room air. Demonstrating appropriate use of IS.    Problem: Mobility  Goal: Risk for activity intolerance will decrease  Outcome: PROGRESSING AS EXPECTED  Pt ambulating frequently around unit.

## 2018-07-05 NOTE — PROGRESS NOTES
0700: Received report from night shift. Pt asleep in bed, easy to arouse. Pt AO x4. Denies pain at this time. Chest tube connections assessed. Pt denies needs. Chart reviewed.

## 2018-07-05 NOTE — CARE PLAN
Problem: Safety  Goal: Will remain free from injury  PT calls for assistance. Pt up SBA d/t chest tube.     Problem: Bowel/Gastric:  Goal: Normal bowel function is maintained or improved  Pt refused bowel meds this AM. LBM this morning.     Problem: Respiratory:  Goal: Respiratory status will improve  Chest tube intact. Dressing clean, dry, intact. Pt denies respiratory issues. Lung sounds diminished throughout the left lobes.
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
## 2018-07-06 ENCOUNTER — APPOINTMENT (OUTPATIENT)
Dept: RADIOLOGY | Facility: MEDICAL CENTER | Age: 28
DRG: 200 | End: 2018-07-06
Attending: NURSE PRACTITIONER
Payer: COMMERCIAL

## 2018-07-06 PROCEDURE — 700111 HCHG RX REV CODE 636 W/ 250 OVERRIDE (IP): Performed by: NURSE PRACTITIONER

## 2018-07-06 PROCEDURE — 700102 HCHG RX REV CODE 250 W/ 637 OVERRIDE(OP): Performed by: NURSE PRACTITIONER

## 2018-07-06 PROCEDURE — 700112 HCHG RX REV CODE 229: Performed by: NURSE PRACTITIONER

## 2018-07-06 PROCEDURE — A9270 NON-COVERED ITEM OR SERVICE: HCPCS | Performed by: NURSE PRACTITIONER

## 2018-07-06 PROCEDURE — 700101 HCHG RX REV CODE 250: Performed by: NURSE PRACTITIONER

## 2018-07-06 PROCEDURE — 71045 X-RAY EXAM CHEST 1 VIEW: CPT

## 2018-07-06 PROCEDURE — 770006 HCHG ROOM/CARE - MED/SURG/GYN SEMI*

## 2018-07-06 RX ADMIN — GABAPENTIN 100 MG: 100 CAPSULE ORAL at 09:42

## 2018-07-06 RX ADMIN — STANDARDIZED SENNA CONCENTRATE AND DOCUSATE SODIUM 1 TABLET: 8.6; 5 TABLET, FILM COATED ORAL at 20:45

## 2018-07-06 RX ADMIN — Medication 1 EACH: at 09:42

## 2018-07-06 RX ADMIN — ACETAMINOPHEN 1000 MG: 500 TABLET, FILM COATED ORAL at 05:07

## 2018-07-06 RX ADMIN — CELECOXIB 200 MG: 200 CAPSULE ORAL at 09:42

## 2018-07-06 RX ADMIN — ENOXAPARIN SODIUM 30 MG: 100 INJECTION SUBCUTANEOUS at 20:45

## 2018-07-06 RX ADMIN — ENOXAPARIN SODIUM 30 MG: 100 INJECTION SUBCUTANEOUS at 09:42

## 2018-07-06 RX ADMIN — ACETAMINOPHEN 1000 MG: 500 TABLET, FILM COATED ORAL at 12:05

## 2018-07-06 RX ADMIN — DOCUSATE SODIUM 100 MG: 100 CAPSULE ORAL at 20:45

## 2018-07-06 RX ADMIN — ACETAMINOPHEN 1000 MG: 500 TABLET, FILM COATED ORAL at 17:24

## 2018-07-06 ASSESSMENT — ENCOUNTER SYMPTOMS
DOUBLE VISION: 0
NECK PAIN: 0
NAUSEA: 0
ABDOMINAL PAIN: 0
MYALGIAS: 1
BACK PAIN: 0
SHORTNESS OF BREATH: 0
HEADACHES: 0
CHILLS: 0
VOMITING: 0
FEVER: 0

## 2018-07-06 ASSESSMENT — PAIN SCALES - GENERAL: PAINLEVEL_OUTOF10: 0

## 2018-07-06 NOTE — PROGRESS NOTES
AA&Ox4. VSS on RA. Denies SOB.  Denies any pain.  Left sided IR placed chest tube in place draining small SS output. Water seal. Patent, no air leak noted. Dressing CDI.  Tolerating regular diet. Denies N/V.  + void. LBM 7/5.   Pt is upself.   All needs met at this time. Call light within reach.

## 2018-07-06 NOTE — PROGRESS NOTES
Bedside report received.    Assessment complete.  A&O x 4. Patient calls appropriately.  Patient up self.   Patient reports mild discomfort. Declines pain medication at this time.  Denies N&V. Tolerating regular diet.  Chest tube left chest to water seal.  + void, + flatus. Patient reports loose BM.   Patient denies SOB.    Review plan with of care with patient. Call light and personal belongings with in reach. Hourly rounding in place. All needs met at this time.
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
## 2018-07-06 NOTE — CARE PLAN
Problem: Communication  Goal: The ability to communicate needs accurately and effectively will improve  Outcome: PROGRESSING AS EXPECTED  Education provided on importance of using call light to alert staff of patient needs. Pt demonstrates understanding by using call light appropriately.    Problem: Venous Thromboembolism (VTW)/Deep Vein Thrombosis (DVT) Prevention:  Goal: Patient will participate in Venous Thrombosis (VTE)/Deep Vein Thrombosis (DVT)Prevention Measures  Outcome: PROGRESSING AS EXPECTED  Lovenox administered per MAR.

## 2018-07-07 ENCOUNTER — APPOINTMENT (OUTPATIENT)
Dept: RADIOLOGY | Facility: MEDICAL CENTER | Age: 28
DRG: 200 | End: 2018-07-07
Attending: NURSE PRACTITIONER
Payer: COMMERCIAL

## 2018-07-07 VITALS
TEMPERATURE: 97.1 F | SYSTOLIC BLOOD PRESSURE: 113 MMHG | HEIGHT: 74 IN | HEART RATE: 63 BPM | BODY MASS INDEX: 19.3 KG/M2 | RESPIRATION RATE: 18 BRPM | WEIGHT: 150.35 LBS | DIASTOLIC BLOOD PRESSURE: 73 MMHG | OXYGEN SATURATION: 98 %

## 2018-07-07 LAB
ALBUMIN SERPL BCP-MCNC: 4.4 G/DL (ref 3.2–4.9)
ALBUMIN/GLOB SERPL: 1.5 G/DL
ALP SERPL-CCNC: 46 U/L (ref 30–99)
ALT SERPL-CCNC: 120 U/L (ref 2–50)
ANION GAP SERPL CALC-SCNC: 8 MMOL/L (ref 0–11.9)
AST SERPL-CCNC: 75 U/L (ref 12–45)
BASOPHILS # BLD AUTO: 0.2 % (ref 0–1.8)
BASOPHILS # BLD: 0.01 K/UL (ref 0–0.12)
BILIRUB SERPL-MCNC: 0.5 MG/DL (ref 0.1–1.5)
BUN SERPL-MCNC: 18 MG/DL (ref 8–22)
CALCIUM SERPL-MCNC: 9.7 MG/DL (ref 8.5–10.5)
CHLORIDE SERPL-SCNC: 104 MMOL/L (ref 96–112)
CO2 SERPL-SCNC: 25 MMOL/L (ref 20–33)
CREAT SERPL-MCNC: 0.81 MG/DL (ref 0.5–1.4)
EOSINOPHIL # BLD AUTO: 0.29 K/UL (ref 0–0.51)
EOSINOPHIL NFR BLD: 5.1 % (ref 0–6.9)
ERYTHROCYTE [DISTWIDTH] IN BLOOD BY AUTOMATED COUNT: 41.8 FL (ref 35.9–50)
GLOBULIN SER CALC-MCNC: 3 G/DL (ref 1.9–3.5)
GLUCOSE SERPL-MCNC: 100 MG/DL (ref 65–99)
HCT VFR BLD AUTO: 45.1 % (ref 42–52)
HGB BLD-MCNC: 15.9 G/DL (ref 14–18)
IMM GRANULOCYTES # BLD AUTO: 0.02 K/UL (ref 0–0.11)
IMM GRANULOCYTES NFR BLD AUTO: 0.4 % (ref 0–0.9)
LYMPHOCYTES # BLD AUTO: 2.23 K/UL (ref 1–4.8)
LYMPHOCYTES NFR BLD: 39.3 % (ref 22–41)
MCH RBC QN AUTO: 32.6 PG (ref 27–33)
MCHC RBC AUTO-ENTMCNC: 35.3 G/DL (ref 33.7–35.3)
MCV RBC AUTO: 92.6 FL (ref 81.4–97.8)
MONOCYTES # BLD AUTO: 0.52 K/UL (ref 0–0.85)
MONOCYTES NFR BLD AUTO: 9.2 % (ref 0–13.4)
NEUTROPHILS # BLD AUTO: 2.6 K/UL (ref 1.82–7.42)
NEUTROPHILS NFR BLD: 45.8 % (ref 44–72)
NRBC # BLD AUTO: 0 K/UL
NRBC BLD-RTO: 0 /100 WBC
PLATELET # BLD AUTO: 216 K/UL (ref 164–446)
PMV BLD AUTO: 9.3 FL (ref 9–12.9)
POTASSIUM SERPL-SCNC: 4.1 MMOL/L (ref 3.6–5.5)
PROT SERPL-MCNC: 7.4 G/DL (ref 6–8.2)
RBC # BLD AUTO: 4.87 M/UL (ref 4.7–6.1)
SODIUM SERPL-SCNC: 137 MMOL/L (ref 135–145)
WBC # BLD AUTO: 5.7 K/UL (ref 4.8–10.8)

## 2018-07-07 PROCEDURE — 80053 COMPREHEN METABOLIC PANEL: CPT

## 2018-07-07 PROCEDURE — 71045 X-RAY EXAM CHEST 1 VIEW: CPT

## 2018-07-07 PROCEDURE — 85025 COMPLETE CBC W/AUTO DIFF WBC: CPT

## 2018-07-07 PROCEDURE — 700111 HCHG RX REV CODE 636 W/ 250 OVERRIDE (IP): Performed by: NURSE PRACTITIONER

## 2018-07-07 PROCEDURE — 36415 COLL VENOUS BLD VENIPUNCTURE: CPT

## 2018-07-07 RX ADMIN — ENOXAPARIN SODIUM 30 MG: 100 INJECTION SUBCUTANEOUS at 07:49

## 2018-07-07 ASSESSMENT — ENCOUNTER SYMPTOMS
BACK PAIN: 0
VOMITING: 0
DOUBLE VISION: 0
ABDOMINAL PAIN: 0
CHILLS: 0
NECK PAIN: 0
HEADACHES: 0
FEVER: 0
SHORTNESS OF BREATH: 0
NAUSEA: 0
MYALGIAS: 1

## 2018-07-07 ASSESSMENT — PAIN SCALES - GENERAL: PAINLEVEL_OUTOF10: 0

## 2018-07-07 NOTE — PROGRESS NOTES
Assessment complete  AA&Ox4. VSS on RA. Denies SOB.  Denies any pain.  Dressing to previous chest tube site CDI.   Tolerating regular diet. Denies N/V.  + void. LBM 7/5/18.   Pt is upself.   All needs met at this time. Call light within reach.

## 2018-07-07 NOTE — PROGRESS NOTES
Received report from evening RN.  Assumed care of patient.  Patient A&Ox4.  No complaints of pain at this time.  No nausea or vomiting.  On room air with O2 saturations of 100%.  Lung sounds clear bilaterally, anterior and posterior.  SALDANA, strength 5/5.  No complaints of numbness/tingling.  Ambulating with a steady gait.  +void.  Last BM 7/5/18.  Skin intact.  Plan of care discussed.  Call light within reach.  Bed in lowest position.

## 2018-07-07 NOTE — PROGRESS NOTES
Patient given discharge instructions and slip for chest xray.  Patient to follow up with Dr. Chaidez after xray.  Patient demonstrated understanding.  All questions answered.  PIV removed.  Patient declined wheelchair out.  Patient discharged home with sigrid.
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
## 2018-07-07 NOTE — CARE PLAN
Problem: Communication  Goal: The ability to communicate needs accurately and effectively will improve  Outcome: PROGRESSING AS EXPECTED  Education provided on importance of using call light to alert staff of patient needs. Pt demonstrates understanding by using call light appropriately.    Problem: Pain Management  Goal: Pain level will decrease to patient's comfort goal  Outcome: PROGRESSING AS EXPECTED  Pt denies any pain.

## 2018-07-07 NOTE — CARE PLAN
Problem: Venous Thromboembolism (VTW)/Deep Vein Thrombosis (DVT) Prevention:  Goal: Patient will participate in Venous Thrombosis (VTE)/Deep Vein Thrombosis (DVT)Prevention Measures  Outcome: PROGRESSING AS EXPECTED  Patient given lovenox for DVT prophylaxis.      Problem: Discharge Barriers/Planning  Goal: Patient's continuum of care needs will be met  Outcome: PROGRESSING AS EXPECTED  RN collected patient's preferred pharmacy and put into admit profile to get ready for discharge toda.

## 2018-07-07 NOTE — DISCHARGE INSTRUCTIONS
DISCHARGE PATIENT Start: 07/07/18 0812, End: 07/07/18 0812, ONCE, ROUTINE     Comments: 1.  Call or seek medical attention if questions or concerns arise   2.  Follow up with Walt Chaidez, trauma surgery, within one weeks time, as needed, or if symptoms worsen   3.  No contact sports, heavy lifting, or strenuous activities until cleared by Dr. Chaidez.   4.  No flying or scuba diving x 2 weeks.   5.  Refrain from prolonged periods of time at elevation   6. No swimming, hot tubs, baths or wound submersion   7.  Dressing to remain in place 72 hours post discharge.  May then apply dry dressing and change as needed   8.  No smoking   9. Seek immediate medical attention for signs and symptoms of infection, respiratory decompensation, and/or new or worsening abdominal pain and seek immediate medical attention.   10.  Follow up 2 view CXR to be completed prior to follow up appoint with Dr. Chaidez   Question: Against Medical Advice Answer: No     Discharge Instructions    Discharged to home by car with relative. Discharged via wheelchair, hospital escort: Yes.  Special equipment needed: Not Applicable    Be sure to schedule a follow-up appointment with your primary care doctor or any specialists as instructed.     Discharge Plan:   Diet Plan: Discussed  Activity Level: Discussed  Smoking Cessation Offered: Patient Counseled  Confirmed Follow up Appointment: Patient to Call and Schedule Appointment  Confirmed Symptoms Management: Discussed  Medication Reconciliation Updated: Yes  Influenza Vaccine Indication: Not indicated: Previously immunized this influenza season and > 8 years of age    I understand that a diet low in cholesterol, fat, and sodium is recommended for good health. Unless I have been given specific instructions below for another diet, I accept this instruction as my diet prescription.   Other diet: Regular     Special Instructions: None    · Is patient discharged on Warfarin / Coumadin?   No      Pneumomediastinum  Pneumomediastinum occurs when air leaks into the mediastinum. The mediastinum is the area between the lungs, just behind the breastbone. This space contains the heart, aorta, venae cavae, esophagus, and thymus.  CAUSES   Pneumomediastinum can occur on its own (spontaneous), or it can occur following an injury (traumatic). Pneumomediastinum is usually caused by a condition or injury that forces air to leak out of the lungs and into the mediastinum. Common conditions and injuries that lead to pneumomediastinum include:  · Childbirth.  · Chest or abdominal injuries, such as blunt or penetrating trauma.  · Asthma.  · Problems during scuba diving.  · Problems during the use of a breathing machine (ventilator).  · Inhaling illegal drugs or chemicals.  · Infections in the face, neck, chest, or abdomen.  · Extreme strain during coughing or vomiting.  · A hole in the intestine or esophagus.  · Ingesting a corrosive solvent.  · Accidentally making a hole in the lung or intestine during surgery or a medical procedure.  · Accidentally breathing an object into the airway.  SYMPTOMS   In some cases, there may be no symptoms. A lack of symptoms is more likely with spontaneous pneumomediastinum. When symptoms are present, they may include:  · Chest pain, which may run into the neck, shoulder, back, or arms.  · Increased pain when moving, swallowing, or taking a deep breath.  · Problems swallowing.  · Problems speaking.  · Vocal changes.  · Shortness of breath.  · Fever.  · Throat or jaw pain.  DIAGNOSIS   Your health care provider may suspect pneumomediastinum based on your symptoms and a physical exam. If needed, imaging tests may be done such as a chest X-ray or CT.  TREATMENT   Spontaneous pneumomediastinum often gets better without any treatment. Your body will slowly reabsorb the air. In more severe cases, treatment is directed at addressing the cause. This may include antibiotic medicines to treat an  infection. In very severe cases, the air may build up and start to put pressure on the heart or lungs. In this case, you will need to stay in the hospital. One of the following procedures may be needed:  · Needle aspiration. This procedure uses a needle that is inserted into the mediastinum to take out the trapped air.  · Chest tube placement. This may be done if you have a collapsed lung.  · Surgery to repair a hole in the intestine or esophagus.  HOME CARE INSTRUCTIONS   · Until your health care provider says it is okay, avoid:  ¨ Air travel.  ¨ Scuba diving.  ¨ High altitudes.  ¨ Hard physical work.  ¨ Exercise.  · Do not use any tobacco products including cigarettes, chewing tobacco, or electronic cigarettes.  · Do not use illegal drugs.  · Only take medicine as directed by your health care provider.  · If you have had surgery, a chest tube placed, or a needle aspiration, watch for drainage, redness, swelling, or pain at any incision or puncture sites. Contact your health care provider if you notice any of these symptoms.  SEEK MEDICAL CARE IF:  You have a fever.  SEEK IMMEDIATE MEDICAL CARE IF:  · You have worsening pain in the chest, neck, jaw, or arms.  · You have trouble breathing.  · You have new problems with speaking or swallowing.  MAKE SURE YOU:  · Understand these instructions.  · Will watch your condition.  · Will get help right away if you are not doing well or get worse.  This information is not intended to replace advice given to you by your health care provider. Make sure you discuss any questions you have with your health care provider.  Document Released: 11/30/2009 Document Revised: 12/23/2014 Document Reviewed: 10/14/2016  Elsevier Interactive Patient Education © 2017 [employer]      Pneumothorax  Introduction  A pneumothorax, commonly called a collapsed lung, is a condition in which air leaks from a lung and builds up in the space between the lung and the chest wall (pleural space). The  air in a pneumothorax is trapped outside the lung and takes up space, preventing the lung from fully expanding. This is a condition that usually occurs suddenly. The buildup of air may be small or large. A small pneumothorax may go away on its own. When a pneumothorax is larger, it will often require medical treatment and hospitalization.  What are the causes?  A pneumothorax can sometimes happen quickly with no apparent cause. People with underlying lung problems, particularly COPD or emphysema, are at higher risk of pneumothorax. However, pneumothorax can happen quickly even in people with no prior known lung problems. Trauma, surgery, medical procedures, or injury to the chest wall can also cause a pneumothorax.  What are the signs or symptoms?  Sometimes a pneumothorax will have no symptoms. When symptoms are present, they can include:  · Chest pain.  · Shortness of breath.  · Increased rate of breathing.  · Bluish color to your lips or skin (cyanosis).  How is this diagnosed?  Pneumothorax is usually diagnosed by a chest X-ray or chest CT scan. Your health care provider will also take a medical history and perform a physical exam to determine why you may have a pneumothorax.  How is this treated?  A small pneumothorax may go away on its own without treatment. Extra oxygen can sometimes help a small pneumothorax go away more quickly. For a larger pneumothorax or a pneumothorax that is causing symptoms, a procedure is usually needed to drain the air. In some cases, the health care provider may drain the air using a needle. In other cases, a chest tube may be inserted into the pleural space. A chest tube is a small tube placed between the ribs and into the pleural space. This removes the extra air and allows the lung to expand back to its normal size. A large pneumothorax will usually require a hospital stay. If there is ongoing air leakage into the pleural space, then the chest tube may need to remain in place  for several days until the air leak has healed. In some cases, surgery may be needed.  Follow these instructions at home:  · Only take over-the-counter or prescription medicines as directed by your health care provider.  · If a cough or pain makes it difficult for you to sleep at night, try sleeping in a semi-upright position in a recliner or by using 2 or 3 pillows.  · Rest and limit activity as directed by your health care provider.  · If you had a chest tube and it was removed, ask your health care provider when it is okay to remove the dressing. Until your health care provider says you can remove the dressing, do not allow it to get wet.  · Do not smoke. Smoking is a risk factor for pneumothorax.  · Do not fly in an airplane or scuba dive until your health care provider says it is okay.  · Follow up with your health care provider as directed.  Get help right away if:  · You have increasing chest pain or shortness of breath.  · You have a cough that is not controlled with suppressants.  · You begin coughing up blood.  · You have pain that is getting worse or is not controlled with medicines.  · You cough up thick, discolored mucus (sputum) that is yellow to green in color.  · You have redness, increasing pain, or discharge at the site where a chest tube had been in place (if your pneumothorax was treated with a chest tube).  · The site where your chest tube was located opens up.  · You feel air coming out of the site where the chest tube was placed.  · You have a fever or persistent symptoms for more than 2-3 days.  · You have a fever and your symptoms suddenly get worse.  This information is not intended to replace advice given to you by your health care provider. Make sure you discuss any questions you have with your health care provider.  Document Released: 12/18/2006 Document Revised: 05/25/2017 Document Reviewed: 05/13/2015  © 2017 Elsevier        Depression / Suicide Risk    As you are discharged from this  [facility] facility, it is important to learn how to keep safe from harming yourself.    Recognize the warning signs:  · Abrupt changes in personality, positive or negative- including increase in energy   · Giving away possessions  · Change in eating patterns- significant weight changes-  positive or negative  · Change in sleeping patterns- unable to sleep or sleeping all the time   · Unwillingness or inability to communicate  · Depression  · Unusual sadness, discouragement and loneliness  · Talk of wanting to die  · Neglect of personal appearance   · Rebelliousness- reckless behavior  · Withdrawal from people/activities they love  · Confusion- inability to concentrate     If you or a loved one observes any of these behaviors or has concerns about self-harm, here's what you can do:  · Talk about it- your feelings and reasons for harming yourself  · Remove any means that you might use to hurt yourself (examples: pills, rope, extension cords, firearm)  · Get professional help from the community (Mental Health, Substance Abuse, psychological counseling)  · Do not be alone:Call your Safe Contact- someone whom you trust who will be there for you.  · Call your local CRISIS HOTLINE 619-9810 or 827-619-1010  · Call your local Children's Mobile Crisis Response Team [place] (045) 727-0087 or www.[employer]  · Call the toll free National Suicide Prevention Hotlines   · National Suicide Prevention Lifeline 900-151-SKYT (9940)  · National Hope Line Network 800-SUICIDE (744-5862)
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
## 2018-07-10 ENCOUNTER — HOSPITAL ENCOUNTER (OUTPATIENT)
Dept: RADIOLOGY | Facility: MEDICAL CENTER | Age: 28
End: 2018-07-10
Attending: SURGERY
Payer: COMMERCIAL

## 2018-07-10 DIAGNOSIS — J93.83 CLOSED PNEUMOTHORAX: ICD-10-CM

## 2018-07-10 PROCEDURE — 71046 X-RAY EXAM CHEST 2 VIEWS: CPT

## 2018-08-03 ENCOUNTER — APPOINTMENT (OUTPATIENT)
Dept: RADIOLOGY | Facility: MEDICAL CENTER | Age: 28
End: 2018-08-03
Attending: EMERGENCY MEDICINE
Payer: COMMERCIAL

## 2018-08-03 ENCOUNTER — HOSPITAL ENCOUNTER (EMERGENCY)
Facility: MEDICAL CENTER | Age: 28
End: 2018-08-03
Attending: EMERGENCY MEDICINE
Payer: COMMERCIAL

## 2018-08-03 VITALS
BODY MASS INDEX: 17.54 KG/M2 | SYSTOLIC BLOOD PRESSURE: 133 MMHG | HEART RATE: 59 BPM | RESPIRATION RATE: 18 BRPM | OXYGEN SATURATION: 99 % | WEIGHT: 141.09 LBS | DIASTOLIC BLOOD PRESSURE: 98 MMHG | TEMPERATURE: 98.9 F | HEIGHT: 75 IN

## 2018-08-03 DIAGNOSIS — R07.81 PLEURITIC CHEST PAIN: ICD-10-CM

## 2018-08-03 LAB
DEPRECATED D DIMER PPP IA-ACNC: <200 NG/ML(D-DU)
EKG IMPRESSION: NORMAL

## 2018-08-03 PROCEDURE — 93005 ELECTROCARDIOGRAM TRACING: CPT

## 2018-08-03 PROCEDURE — 99284 EMERGENCY DEPT VISIT MOD MDM: CPT

## 2018-08-03 PROCEDURE — 71045 X-RAY EXAM CHEST 1 VIEW: CPT

## 2018-08-03 PROCEDURE — 85379 FIBRIN DEGRADATION QUANT: CPT

## 2018-08-03 PROCEDURE — 36415 COLL VENOUS BLD VENIPUNCTURE: CPT

## 2018-08-03 PROCEDURE — 93005 ELECTROCARDIOGRAM TRACING: CPT | Performed by: EMERGENCY MEDICINE

## 2018-08-03 RX ORDER — KETOROLAC TROMETHAMINE 10 MG/1
10 TABLET, FILM COATED ORAL EVERY 6 HOURS PRN
Qty: 22 TAB | Refills: 0 | Status: SHIPPED | OUTPATIENT
Start: 2018-08-03 | End: 2020-06-08

## 2018-08-03 ASSESSMENT — PAIN SCALES - GENERAL: PAINLEVEL_OUTOF10: 5

## 2018-08-03 ASSESSMENT — LIFESTYLE VARIABLES: DO YOU DRINK ALCOHOL: NO
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
## 2018-08-03 NOTE — DISCHARGE INSTRUCTIONS
Chest Wall Pain  Introduction  Chest wall pain is pain in or around the bones and muscles of your chest. Sometimes, an injury causes this pain. Sometimes, the cause may not be known. This pain may take several weeks or longer to get better.  Follow these instructions at home:  Pay attention to any changes in your symptoms. Take these actions to help with your pain:  · Rest as told by your doctor.  · Avoid activities that cause pain. Try not to use your chest, belly (abdominal), or side muscles to lift heavy things.  · If directed, apply ice to the painful area:  ¨ Put ice in a plastic bag.  ¨ Place a towel between your skin and the bag.  ¨ Leave the ice on for 20 minutes, 2-3 times per day.  · Take over-the-counter and prescription medicines only as told by your doctor.  · Do not use tobacco products, including cigarettes, chewing tobacco, and e-cigarettes. If you need help quitting, ask your doctor.  · Keep all follow-up visits as told by your doctor. This is important.  Contact a doctor if:  · You have a fever.  · Your chest pain gets worse.  · You have new symptoms.  Get help right away if:  · You feel sick to your stomach (nauseous) or you throw up (vomit).  · You feel sweaty or light-headed.  · You have a cough with phlegm (sputum) or you cough up blood.  · You are short of breath.  This information is not intended to replace advice given to you by your health care provider. Make sure you discuss any questions you have with your health care provider.  Document Released: 06/05/2009 Document Revised: 05/25/2017 Document Reviewed: 03/14/2016  © 2017 Elsevier  Return if fever, vomiting or if no better in 12 hours.

## 2018-10-17 ENCOUNTER — HOSPITAL ENCOUNTER (EMERGENCY)
Facility: MEDICAL CENTER | Age: 28
End: 2018-10-17
Attending: EMERGENCY MEDICINE
Payer: COMMERCIAL

## 2018-10-17 ENCOUNTER — APPOINTMENT (OUTPATIENT)
Dept: RADIOLOGY | Facility: MEDICAL CENTER | Age: 28
End: 2018-10-17
Attending: EMERGENCY MEDICINE
Payer: COMMERCIAL

## 2018-10-17 VITALS
OXYGEN SATURATION: 99 % | HEIGHT: 75 IN | DIASTOLIC BLOOD PRESSURE: 80 MMHG | BODY MASS INDEX: 17.16 KG/M2 | RESPIRATION RATE: 16 BRPM | TEMPERATURE: 97.1 F | HEART RATE: 90 BPM | WEIGHT: 138.01 LBS | SYSTOLIC BLOOD PRESSURE: 130 MMHG

## 2018-10-17 DIAGNOSIS — S82.892A CLOSED FRACTURE OF LEFT ANKLE, INITIAL ENCOUNTER: ICD-10-CM

## 2018-10-17 PROCEDURE — 99284 EMERGENCY DEPT VISIT MOD MDM: CPT

## 2018-10-17 PROCEDURE — 73610 X-RAY EXAM OF ANKLE: CPT | Mod: LT

## 2018-10-17 ASSESSMENT — LIFESTYLE VARIABLES
HOW MANY TIMES IN THE PAST YEAR HAVE YOU HAD 5 OR MORE DRINKS IN A DAY: 0
AVERAGE NUMBER OF DAYS PER WEEK YOU HAVE A DRINK CONTAINING ALCOHOL: 2
HAVE YOU EVER FELT YOU SHOULD CUT DOWN ON YOUR DRINKING: NO
ON A TYPICAL DAY WHEN YOU DRINK ALCOHOL HOW MANY DRINKS DO YOU HAVE: 2
TOTAL SCORE: 0
TOTAL SCORE: 0
DO YOU DRINK ALCOHOL: YES
EVER HAD A DRINK FIRST THING IN THE MORNING TO STEADY YOUR NERVES TO GET RID OF A HANGOVER: NO
HAVE PEOPLE ANNOYED YOU BY CRITICIZING YOUR DRINKING: NO
CONSUMPTION TOTAL: NEGATIVE
EVER FELT BAD OR GUILTY ABOUT YOUR DRINKING: NO
TOTAL SCORE: 0

## 2018-10-17 ASSESSMENT — PAIN SCALES - GENERAL: PAINLEVEL_OUTOF10: 9

## 2018-10-18 NOTE — ED NOTES
Pt ambulatory  Vital signs stable  Pt handed d/c paperwork with understanding stated  Pt states will follow up with PCP  Pt handed prescriptions and states safe way home  Pt given work note

## 2018-10-18 NOTE — ED TRIAGE NOTES
"Chief Complaint   Patient presents with   • Ankle Injury     left     /99   Pulse (!) 102   Temp 36.2 °C (97.1 °F) (Temporal)   Resp 18   Ht 1.905 m (6' 3\")   Wt 62.6 kg (138 lb 0.1 oz)   SpO2 99%   BMI 17.25 kg/m²     Pt slipped and \"twisted\" left ankle. There is swelling around the ankle. Able to tolerate light weight on ankle.  Denies numbness or tingling in foot.    Pt place back out to lobby.    "

## 2018-10-18 NOTE — ED PROVIDER NOTES
"ED Provider Note    CHIEF COMPLAINT  Chief Complaint   Patient presents with   • Ankle Injury     left       HPI  Aristides Kang is a 28 y.o. male here for evaluation of left ankle pain.  The pt states that he was leaving work today, when he slipped, and 'rolled' the ankle.  He has no other injuries.   No knee pain, and he did not strike the ground. He did not take any thing for pain, pta.  He states the pain is worse with walking on the ankle, and alleviated after resting.  He describes it as an aching pain.      PAST MEDICAL HISTORY   no bleeding disorders.     SOCIAL HISTORY  Social History     Social History Main Topics   • Smoking status: Current Every Day Smoker     Packs/day: 0.50     Years: 4.00     Types: Cigarettes   • Smokeless tobacco: Not on file   • Alcohol use Yes      Comment: occ   • Drug use: No   • Sexual activity: Not on file       SURGICAL HISTORY  patient denies any surgical history    CURRENT MEDICATIONS  Home Medications     Reviewed by Lei Lockett R.N. (Registered Nurse) on 10/17/18 at 2208  Med List Status: Not Addressed   Medication Last Dose Status        Patient Nestor Taking any Medications                       ALLERGIES  No Known Allergies    REVIEW OF SYSTEMS  See HPI for further details. Review of systems as above, otherwise all other systems are negative.     PHYSICAL EXAM  VITAL SIGNS: /99   Pulse (!) 102   Temp 36.2 °C (97.1 °F) (Temporal)   Resp 18   Ht 1.905 m (6' 3\")   Wt 62.6 kg (138 lb 0.1 oz)   SpO2 99%   BMI 17.25 kg/m²     Constitutional: Well developed, well nourished. No acute distress.  HEENT: Normocephalic, atraumatic. MMM  Neck: Supple, Full range of motion   Chest/Pulmonary:  No respiratory distress.  Equal expansion   Musculoskeletal:  Left lower ext:  Lateral malleolus tenderness to palp, and mild edema.  N/v intact distally, non tender left knee.  No abrasion/laceration,  Neuro: Clear speech, appropriate, cooperative, cranial nerves II-XII " grossly intact.  Psych: Normal mood and affect    DX-ANKLE 3+ VIEWS LEFT   Final Result         1.  Small ossific density in the medial ankle mortise joint space, compatible with age indeterminant ligamentous avulsion fracture fragment.   2.  Small ankle joint effusion.                 PROCEDURES     MEDICAL RECORD  I have reviewed patient's medical record and pertinent results are listed above.    COURSE & MEDICAL DECISION MAKING  I have reviewed any medical record information, laboratory studies and radiographic results as noted above.    I you have had any blood pressure issues while here in the emergency department, please see your doctor for a further evaluation or work up.    Differential diagnoses include but not limited to: fracture vs strain.     This patient presents with left ankle fracture .  At this time, I have counseled the patient/family regarding their medications, pain control, and follow up.  They will continue their medications, if any, as prescribed.  They will return immediately for any worsening symptoms and/or any other medical concerns.  They will see their doctor, or contact the doctor provided, in 1-2 days for follow up.       FINAL IMPRESSION  1. Closed fracture of left ankle, initial encounter      Electronically signed by: Parth Schafre, 10/17/2018 10:58 PM

## 2018-10-18 NOTE — ED NOTES
KINDER FALL RISK       RISK  Present to ED b/c of fall (syncope, seizure, or ALOC) y  Age  >70 n  Altered Mental Status (Intoxicated with alcohol or substance confusion, inability to follow instructions, disorientation) n  Impaired Mobility (Ambulates or transfers with assistive devices or assist. Ambulates with unsteady gait and no assistance.  Unable to ambulate to transfer.) n  Nursing Judgement (Bowel or bladder incontinence, diarrhea, urinary frequency or urgency, leg weakness, orthostatic hypotension, dizziness or vertigo, narcotic use.) n    YES TO ANY RISK = HIGH FALL RISK     Interventions in place marked in RED   1. Move patient closer to nurses stations  2. Familiarize the patient with environment  3. Place call light within reach and demonstrate call light use  4. Keep patients personal possessions within patient safe reach (if appropriate)   5. Place stretcher in low position and brakes locked  6.Place yellow socks and armband on patient   7. Place green triangle on patients door  8. Give patient urinal if applicable  9. Keep floor surfaces clean and dry  10.Keep patient care areas uncluttered  11. Use a lap belt or posey vest   12. Assess patient hourly for :Pain, persona needs, position change, and call light access.

## 2019-11-12 ENCOUNTER — OFFICE VISIT (OUTPATIENT)
Dept: OCCUPATIONAL MEDICINE | Facility: CLINIC | Age: 29
End: 2019-11-12

## 2019-11-12 ENCOUNTER — NON-PROVIDER VISIT (OUTPATIENT)
Dept: OCCUPATIONAL MEDICINE | Facility: CLINIC | Age: 29
End: 2019-11-12

## 2019-11-12 VITALS
SYSTOLIC BLOOD PRESSURE: 134 MMHG | BODY MASS INDEX: 18.65 KG/M2 | OXYGEN SATURATION: 94 % | HEIGHT: 75 IN | HEART RATE: 88 BPM | TEMPERATURE: 97.8 F | DIASTOLIC BLOOD PRESSURE: 84 MMHG | WEIGHT: 150 LBS

## 2019-11-12 DIAGNOSIS — Z02.1 PRE-EMPLOYMENT HEALTH SCREENING EXAMINATION: ICD-10-CM

## 2019-11-12 DIAGNOSIS — Z02.83 ENCOUNTER FOR DRUG SCREENING: ICD-10-CM

## 2019-11-12 DIAGNOSIS — Z02.1 PRE-EMPLOYMENT DRUG SCREENING: ICD-10-CM

## 2019-11-12 LAB
AMP AMPHETAMINE: NORMAL
BAR BARBITURATES: NORMAL
BZO BENZODIAZEPINES: NORMAL
COC COCAINE: NORMAL
INT CON NEG: NORMAL
INT CON POS: NORMAL
MDMA ECSTASY: NORMAL
MET METHAMPHETAMINES: NORMAL
MTD METHADONE: NORMAL
OPI OPIATES: NORMAL
OXY OXYCODONE: NORMAL
PCP PHENCYCLIDINE: NORMAL
POC URINE DRUG SCREEN OCDRS: NORMAL
THC: NORMAL

## 2019-11-12 PROCEDURE — 8915 PR COMPREHENSIVE PHYSICAL: Performed by: NURSE PRACTITIONER

## 2019-11-12 PROCEDURE — 92552 PURE TONE AUDIOMETRY AIR: CPT | Performed by: PREVENTIVE MEDICINE

## 2019-11-12 PROCEDURE — 80305 DRUG TEST PRSMV DIR OPT OBS: CPT | Performed by: PREVENTIVE MEDICINE

## 2019-11-12 PROCEDURE — 8911 PR MRO FEE: Performed by: PREVENTIVE MEDICINE

## 2019-11-12 NOTE — PROGRESS NOTES
Pre-employment physical completed, see scanned documents  Audiogram screening. See scanned documents

## 2020-06-07 ENCOUNTER — OFFICE VISIT (OUTPATIENT)
Dept: URGENT CARE | Facility: CLINIC | Age: 30
End: 2020-06-07

## 2020-06-07 VITALS
DIASTOLIC BLOOD PRESSURE: 82 MMHG | BODY MASS INDEX: 18.48 KG/M2 | HEIGHT: 75 IN | SYSTOLIC BLOOD PRESSURE: 124 MMHG | WEIGHT: 148.6 LBS | OXYGEN SATURATION: 97 % | HEART RATE: 63 BPM | TEMPERATURE: 98.1 F | RESPIRATION RATE: 14 BRPM

## 2020-06-07 DIAGNOSIS — K04.7 DENTAL ABSCESS: ICD-10-CM

## 2020-06-07 PROCEDURE — 99214 OFFICE O/P EST MOD 30 MIN: CPT | Performed by: PHYSICIAN ASSISTANT

## 2020-06-07 RX ORDER — HYDROCODONE BITARTRATE AND ACETAMINOPHEN 5; 325 MG/1; MG/1
1 TABLET ORAL EVERY 6 HOURS PRN
Qty: 10 TAB | Refills: 0 | Status: SHIPPED | OUTPATIENT
Start: 2020-06-07 | End: 2020-06-10

## 2020-06-07 RX ORDER — CLINDAMYCIN HYDROCHLORIDE 300 MG/1
300 CAPSULE ORAL 3 TIMES DAILY
Qty: 21 CAP | Refills: 0 | Status: ON HOLD | OUTPATIENT
Start: 2020-06-07 | End: 2020-06-10

## 2020-06-07 ASSESSMENT — ENCOUNTER SYMPTOMS
MYALGIAS: 0
FEVER: 0
SORE THROAT: 0
SINUS PAIN: 0
CHILLS: 1
SHORTNESS OF BREATH: 0

## 2020-06-07 ASSESSMENT — FIBROSIS 4 INDEX: FIB4 SCORE: 0.95

## 2020-06-07 NOTE — PATIENT INSTRUCTIONS
Dental Abscess  A dental abscess is a collection of pus in or around a tooth.  What are the causes?  This condition is caused by a bacterial infection around the root of the tooth that involves the inner part of the tooth (pulp). It may result from:  · Severe tooth decay.  · Trauma to the tooth that allows bacteria to enter into the pulp, such as a broken or chipped tooth.  · Severe gum disease around a tooth.  What are the signs or symptoms?  Symptoms of this condition include:  · Severe pain in and around the infected tooth.  · Swelling and redness around the infected tooth, in the mouth, or in the face.  · Tenderness.  · Pus drainage.  · Bad breath.  · Bitter taste in the mouth.  · Difficulty swallowing.  · Difficulty opening the mouth.  · Nausea.  · Vomiting.  · Chills.  · Swollen neck glands.  · Fever.  How is this diagnosed?  This condition is diagnosed with examination of the infected tooth. During the exam, your dentist may tap on the infected tooth. Your dentist will also ask about your medical and dental history and may order X-rays.  How is this treated?  This condition is treated by eliminating the infection. This may be done with:  · Antibiotic medicine.  · A root canal. This may be performed to save the tooth.  · Pulling (extracting) the tooth. This may also involve draining the abscess. This is done if the tooth cannot be saved.  Follow these instructions at home:  · Take medicines only as directed by your dentist.  · If you were prescribed antibiotic medicine, finish all of it even if you start to feel better.  · Rinse your mouth (gargle) often with salt water to relieve pain or swelling.  · Do not drive or operate heavy machinery while taking pain medicine.  · Do not apply heat to the outside of your mouth.  · Keep all follow-up visits as directed by your dentist. This is important.  Contact a health care provider if:  · Your pain is worse and is not helped by medicine.  Get help right away  if:  · You have a fever or chills.  · Your symptoms suddenly get worse.  · You have a very bad headache.  · You have problems breathing or swallowing.  · You have trouble opening your mouth.  · You have swelling in your neck or around your eye.  This information is not intended to replace advice given to you by your health care provider. Make sure you discuss any questions you have with your health care provider.  Document Released: 12/18/2006 Document Revised: 04/27/2017 Document Reviewed: 12/15/2015  [employer] Interactive Patient Education © 2017 [employer]

## 2020-06-07 NOTE — PROGRESS NOTES
"Subjective:      Aristides Kang is a 30 y.o. male who presents with Oral Swelling (x 3 day.  Molars on the R bottom side are painful, swelling in his R jaw gouing to his R eye and chills.  )            Oral Pain   This is a new problem. The current episode started in the past 7 days. The problem occurs constantly. Associated symptoms include chills. Pertinent negatives include no chest pain, fever, myalgias or sore throat. The symptoms are aggravated by eating.       Review of Systems   Constitutional: Positive for chills. Negative for fever.   HENT: Negative for ear pain, sinus pain and sore throat.         Dental pain   Respiratory: Negative for shortness of breath.    Cardiovascular: Negative for chest pain.   Musculoskeletal: Negative for myalgias.     PMH:  has no past medical history on file.  MEDS:   Current Outpatient Medications:   •  clindamycin (CLEOCIN) 300 MG Cap, Take 1 Cap by mouth 3 times a day for 7 days., Disp: 21 Cap, Rfl: 0  •  HYDROcodone-acetaminophen (NORCO) 5-325 MG Tab per tablet, Take 1 Tab by mouth every 6 hours as needed for up to 3 days., Disp: 10 Tab, Rfl: 0  •  ketorolac (TORADOL) 10 MG Tab, Take 1 Tab by mouth every 6 hours as needed for up to 22 doses. (Patient not taking: Reported on 11/12/2019), Disp: 22 Tab, Rfl: 0  ALLERGIES: No Known Allergies  SURGHX: History reviewed. No pertinent surgical history.  SOCHX:  reports that he has been smoking cigarettes. He has a 2.00 pack-year smoking history. He has never used smokeless tobacco. He reports current alcohol use. He reports that he does not use drugs.  FH: Family history was reviewed, no pertinent findings to report  Medications, Allergies, and current problem list reviewed today in Epic       Objective:     Blood Pressure 124/82   Pulse 63   Temperature 36.7 °C (98.1 °F) (Temporal)   Respiration 14   Height 1.905 m (6' 3\")   Weight 67.4 kg (148 lb 9.6 oz)   Oxygen Saturation 97%   Body Mass Index 18.57 kg/m²      Physical " Exam  Vitals signs reviewed.   Constitutional:       Appearance: He is well-developed.   HENT:      Head: Normocephalic.      Jaw: No trismus.      Right Ear: External ear normal.      Left Ear: External ear normal.      Nose: Nose normal.      Mouth/Throat:      Dentition: Abnormal dentition. Dental tenderness and dental abscesses present.      Pharynx: Uvula midline. No oropharyngeal exudate or posterior oropharyngeal erythema.      Tonsils: No tonsillar abscesses.     Eyes:      Conjunctiva/sclera: Conjunctivae normal.   Neck:      Musculoskeletal: Normal range of motion and neck supple.      Thyroid: No thyromegaly.      Vascular: No JVD.      Trachea: No tracheal deviation.   Cardiovascular:      Rate and Rhythm: Normal rate and regular rhythm.      Heart sounds: Normal heart sounds.   Pulmonary:      Effort: Pulmonary effort is normal.      Breath sounds: Normal breath sounds. No stridor.   Lymphadenopathy:      Cervical: No cervical adenopathy.   Skin:     Findings: No erythema.   Neurological:      Mental Status: He is alert and oriented to person, place, and time.   Psychiatric:         Behavior: Behavior normal.         Thought Content: Thought content normal.         Judgment: Judgment normal.                 Assessment/Plan:       1. Dental abscess    - clindamycin (CLEOCIN) 300 MG Cap; Take 1 Cap by mouth 3 times a day for 7 days.  Dispense: 21 Cap; Refill: 0  - HYDROcodone-acetaminophen (NORCO) 5-325 MG Tab per tablet; Take 1 Tab by mouth every 6 hours as needed for up to 3 days.  Dispense: 10 Tab; Refill: 0    Differential diagnosis, natural history, supportive care discussed. Follow-up with a dentist within 7-10 days, emergency room precautions discussed.  Patient and/or family appears understanding of information.  Handout and review of patients diagnosis and treatment was discussed extensively.

## 2020-06-07 NOTE — LETTER
June 7, 2020         Patient: Aristides Kang   YOB: 1990   Date of Visit: 6/7/2020           To Whom it May Concern:    Aristides Kang was seen in my clinic on 6/7/2020. Please excuse him from work 6/7- 6/10/2020. He may return earlier if feeling better.  If you have any questions or concerns, please don't hesitate to call.        Sincerely,           Gasper Hdez P.A.-C.  Electronically Signed

## 2020-06-08 ENCOUNTER — ANESTHESIA (OUTPATIENT)
Dept: SURGERY | Facility: MEDICAL CENTER | Age: 30
DRG: 137 | End: 2020-06-08

## 2020-06-08 ENCOUNTER — HOSPITAL ENCOUNTER (INPATIENT)
Facility: MEDICAL CENTER | Age: 30
LOS: 2 days | DRG: 137 | End: 2020-06-10
Attending: EMERGENCY MEDICINE | Admitting: HOSPITALIST

## 2020-06-08 ENCOUNTER — APPOINTMENT (OUTPATIENT)
Dept: RADIOLOGY | Facility: MEDICAL CENTER | Age: 30
DRG: 137 | End: 2020-06-08
Attending: EMERGENCY MEDICINE
Payer: OTHER GOVERNMENT

## 2020-06-08 ENCOUNTER — ANESTHESIA EVENT (OUTPATIENT)
Dept: SURGERY | Facility: MEDICAL CENTER | Age: 30
DRG: 137 | End: 2020-06-08

## 2020-06-08 DIAGNOSIS — K04.7 DENTAL ABSCESS: ICD-10-CM

## 2020-06-08 PROBLEM — Z72.0 TOBACCO ABUSE: Status: ACTIVE | Noted: 2020-06-08

## 2020-06-08 PROBLEM — K12.2 ORAL ABSCESS: Status: ACTIVE | Noted: 2020-06-08

## 2020-06-08 LAB
ALBUMIN SERPL BCP-MCNC: 4.3 G/DL (ref 3.2–4.9)
ALBUMIN/GLOB SERPL: 1.4 G/DL
ALP SERPL-CCNC: 57 U/L (ref 30–99)
ALT SERPL-CCNC: 16 U/L (ref 2–50)
ANION GAP SERPL CALC-SCNC: 13 MMOL/L (ref 7–16)
AST SERPL-CCNC: 11 U/L (ref 12–45)
BASOPHILS # BLD AUTO: 0.1 % (ref 0–1.8)
BASOPHILS # BLD: 0.01 K/UL (ref 0–0.12)
BILIRUB SERPL-MCNC: 0.6 MG/DL (ref 0.1–1.5)
BUN SERPL-MCNC: 6 MG/DL (ref 8–22)
CALCIUM SERPL-MCNC: 9.1 MG/DL (ref 8.5–10.5)
CHLORIDE SERPL-SCNC: 100 MMOL/L (ref 96–112)
CO2 SERPL-SCNC: 23 MMOL/L (ref 20–33)
COVID ORDER STATUS COVID19: NORMAL
CREAT SERPL-MCNC: 0.68 MG/DL (ref 0.5–1.4)
EOSINOPHIL # BLD AUTO: 0.07 K/UL (ref 0–0.51)
EOSINOPHIL NFR BLD: 0.7 % (ref 0–6.9)
ERYTHROCYTE [DISTWIDTH] IN BLOOD BY AUTOMATED COUNT: 43.8 FL (ref 35.9–50)
GLOBULIN SER CALC-MCNC: 3.1 G/DL (ref 1.9–3.5)
GLUCOSE SERPL-MCNC: 87 MG/DL (ref 65–99)
HCT VFR BLD AUTO: 44.5 % (ref 42–52)
HGB BLD-MCNC: 15.1 G/DL (ref 14–18)
IMM GRANULOCYTES # BLD AUTO: 0.05 K/UL (ref 0–0.11)
IMM GRANULOCYTES NFR BLD AUTO: 0.5 % (ref 0–0.9)
LYMPHOCYTES # BLD AUTO: 2.22 K/UL (ref 1–4.8)
LYMPHOCYTES NFR BLD: 21.1 % (ref 22–41)
MCH RBC QN AUTO: 32.2 PG (ref 27–33)
MCHC RBC AUTO-ENTMCNC: 33.9 G/DL (ref 33.7–35.3)
MCV RBC AUTO: 94.9 FL (ref 81.4–97.8)
MONOCYTES # BLD AUTO: 1.21 K/UL (ref 0–0.85)
MONOCYTES NFR BLD AUTO: 11.5 % (ref 0–13.4)
NEUTROPHILS # BLD AUTO: 6.95 K/UL (ref 1.82–7.42)
NEUTROPHILS NFR BLD: 66.1 % (ref 44–72)
NRBC # BLD AUTO: 0 K/UL
NRBC BLD-RTO: 0 /100 WBC
PLATELET # BLD AUTO: 225 K/UL (ref 164–446)
PMV BLD AUTO: 9.1 FL (ref 9–12.9)
POTASSIUM SERPL-SCNC: 4.1 MMOL/L (ref 3.6–5.5)
PROT SERPL-MCNC: 7.4 G/DL (ref 6–8.2)
RBC # BLD AUTO: 4.69 M/UL (ref 4.7–6.1)
SARS-COV-2 RNA RESP QL NAA+PROBE: NOTDETECTED
SODIUM SERPL-SCNC: 136 MMOL/L (ref 135–145)
SPECIMEN SOURCE: NORMAL
WBC # BLD AUTO: 10.5 K/UL (ref 4.8–10.8)

## 2020-06-08 PROCEDURE — 700117 HCHG RX CONTRAST REV CODE 255: Performed by: EMERGENCY MEDICINE

## 2020-06-08 PROCEDURE — 96375 TX/PRO/DX INJ NEW DRUG ADDON: CPT

## 2020-06-08 PROCEDURE — 700101 HCHG RX REV CODE 250: Performed by: ANESTHESIOLOGY

## 2020-06-08 PROCEDURE — 99222 1ST HOSP IP/OBS MODERATE 55: CPT | Mod: 25 | Performed by: HOSPITALIST

## 2020-06-08 PROCEDURE — 70355 PANORAMIC X-RAY OF JAWS: CPT

## 2020-06-08 PROCEDURE — 80053 COMPREHEN METABOLIC PANEL: CPT

## 2020-06-08 PROCEDURE — 99285 EMERGENCY DEPT VISIT HI MDM: CPT

## 2020-06-08 PROCEDURE — 700111 HCHG RX REV CODE 636 W/ 250 OVERRIDE (IP): Performed by: ANESTHESIOLOGY

## 2020-06-08 PROCEDURE — C9803 HOPD COVID-19 SPEC COLLECT: HCPCS | Performed by: EMERGENCY MEDICINE

## 2020-06-08 PROCEDURE — 160048 HCHG OR STATISTICAL LEVEL 1-5: Performed by: DENTIST

## 2020-06-08 PROCEDURE — 0CTX0Z1 RESECTION OF LOWER TOOTH, MULTIPLE, OPEN APPROACH: ICD-10-PCS | Performed by: DENTIST

## 2020-06-08 PROCEDURE — 0W950ZZ DRAINAGE OF LOWER JAW, OPEN APPROACH: ICD-10-PCS | Performed by: DENTIST

## 2020-06-08 PROCEDURE — A9270 NON-COVERED ITEM OR SERVICE: HCPCS | Performed by: HOSPITALIST

## 2020-06-08 PROCEDURE — 770009 HCHG ROOM/CARE - ONCOLOGY SEMI PRI*

## 2020-06-08 PROCEDURE — 700111 HCHG RX REV CODE 636 W/ 250 OVERRIDE (IP): Performed by: EMERGENCY MEDICINE

## 2020-06-08 PROCEDURE — 160009 HCHG ANES TIME/MIN: Performed by: DENTIST

## 2020-06-08 PROCEDURE — 700111 HCHG RX REV CODE 636 W/ 250 OVERRIDE (IP): Performed by: HOSPITALIST

## 2020-06-08 PROCEDURE — 70487 CT MAXILLOFACIAL W/DYE: CPT

## 2020-06-08 PROCEDURE — 160035 HCHG PACU - 1ST 60 MINS PHASE I: Performed by: DENTIST

## 2020-06-08 PROCEDURE — 85025 COMPLETE CBC W/AUTO DIFF WBC: CPT

## 2020-06-08 PROCEDURE — 99406 BEHAV CHNG SMOKING 3-10 MIN: CPT | Performed by: HOSPITALIST

## 2020-06-08 PROCEDURE — 700105 HCHG RX REV CODE 258: Performed by: HOSPITALIST

## 2020-06-08 PROCEDURE — 96365 THER/PROPH/DIAG IV INF INIT: CPT

## 2020-06-08 PROCEDURE — 160027 HCHG SURGERY MINUTES - 1ST 30 MINS LEVEL 2: Performed by: DENTIST

## 2020-06-08 PROCEDURE — 160002 HCHG RECOVERY MINUTES (STAT): Performed by: DENTIST

## 2020-06-08 PROCEDURE — 700105 HCHG RX REV CODE 258: Performed by: EMERGENCY MEDICINE

## 2020-06-08 PROCEDURE — 700101 HCHG RX REV CODE 250: Performed by: DENTIST

## 2020-06-08 PROCEDURE — U0004 COV-19 TEST NON-CDC HGH THRU: HCPCS

## 2020-06-08 PROCEDURE — 700102 HCHG RX REV CODE 250 W/ 637 OVERRIDE(OP): Performed by: HOSPITALIST

## 2020-06-08 RX ORDER — ONDANSETRON 2 MG/ML
INJECTION INTRAMUSCULAR; INTRAVENOUS PRN
Status: DISCONTINUED | OUTPATIENT
Start: 2020-06-08 | End: 2020-06-08 | Stop reason: SURG

## 2020-06-08 RX ORDER — ONDANSETRON 4 MG/1
4 TABLET, ORALLY DISINTEGRATING ORAL EVERY 4 HOURS PRN
Status: DISCONTINUED | OUTPATIENT
Start: 2020-06-08 | End: 2020-06-10 | Stop reason: HOSPADM

## 2020-06-08 RX ORDER — ONDANSETRON 2 MG/ML
4 INJECTION INTRAMUSCULAR; INTRAVENOUS ONCE
Status: COMPLETED | OUTPATIENT
Start: 2020-06-08 | End: 2020-06-08

## 2020-06-08 RX ORDER — HALOPERIDOL 5 MG/ML
1 INJECTION INTRAMUSCULAR
Status: DISCONTINUED | OUTPATIENT
Start: 2020-06-08 | End: 2020-06-08 | Stop reason: HOSPADM

## 2020-06-08 RX ORDER — HALOPERIDOL 5 MG/ML
INJECTION INTRAMUSCULAR
Status: DISPENSED
Start: 2020-06-08 | End: 2020-06-09

## 2020-06-08 RX ORDER — MORPHINE SULFATE 4 MG/ML
4 INJECTION, SOLUTION INTRAMUSCULAR; INTRAVENOUS ONCE
Status: COMPLETED | OUTPATIENT
Start: 2020-06-08 | End: 2020-06-08

## 2020-06-08 RX ORDER — LIDOCAINE HYDROCHLORIDE 20 MG/ML
INJECTION, SOLUTION EPIDURAL; INFILTRATION; INTRACAUDAL; PERINEURAL PRN
Status: DISCONTINUED | OUTPATIENT
Start: 2020-06-08 | End: 2020-06-08 | Stop reason: SURG

## 2020-06-08 RX ORDER — SODIUM CHLORIDE, SODIUM LACTATE, POTASSIUM CHLORIDE, CALCIUM CHLORIDE 600; 310; 30; 20 MG/100ML; MG/100ML; MG/100ML; MG/100ML
INJECTION, SOLUTION INTRAVENOUS CONTINUOUS
Status: DISCONTINUED | OUTPATIENT
Start: 2020-06-08 | End: 2020-06-10 | Stop reason: HOSPADM

## 2020-06-08 RX ORDER — MORPHINE SULFATE 4 MG/ML
4 INJECTION, SOLUTION INTRAMUSCULAR; INTRAVENOUS
Status: DISCONTINUED | OUTPATIENT
Start: 2020-06-08 | End: 2020-06-10 | Stop reason: HOSPADM

## 2020-06-08 RX ORDER — POLYETHYLENE GLYCOL 3350 17 G/17G
1 POWDER, FOR SOLUTION ORAL
Status: DISCONTINUED | OUTPATIENT
Start: 2020-06-08 | End: 2020-06-10 | Stop reason: HOSPADM

## 2020-06-08 RX ORDER — DIPHENHYDRAMINE HYDROCHLORIDE 50 MG/ML
12.5 INJECTION INTRAMUSCULAR; INTRAVENOUS
Status: DISCONTINUED | OUTPATIENT
Start: 2020-06-08 | End: 2020-06-08 | Stop reason: HOSPADM

## 2020-06-08 RX ORDER — ONDANSETRON 2 MG/ML
4 INJECTION INTRAMUSCULAR; INTRAVENOUS
Status: DISCONTINUED | OUTPATIENT
Start: 2020-06-08 | End: 2020-06-08 | Stop reason: HOSPADM

## 2020-06-08 RX ORDER — ACETAMINOPHEN 325 MG/1
650 TABLET ORAL EVERY 6 HOURS PRN
Status: DISCONTINUED | OUTPATIENT
Start: 2020-06-08 | End: 2020-06-10 | Stop reason: HOSPADM

## 2020-06-08 RX ORDER — ONDANSETRON 2 MG/ML
4 INJECTION INTRAMUSCULAR; INTRAVENOUS ONCE
Status: DISCONTINUED | OUTPATIENT
Start: 2020-06-08 | End: 2020-06-08

## 2020-06-08 RX ORDER — DEXAMETHASONE SODIUM PHOSPHATE 4 MG/ML
INJECTION, SOLUTION INTRA-ARTICULAR; INTRALESIONAL; INTRAMUSCULAR; INTRAVENOUS; SOFT TISSUE PRN
Status: DISCONTINUED | OUTPATIENT
Start: 2020-06-08 | End: 2020-06-08 | Stop reason: SURG

## 2020-06-08 RX ORDER — MAGNESIUM HYDROXIDE 1200 MG/15ML
LIQUID ORAL
Status: COMPLETED | OUTPATIENT
Start: 2020-06-08 | End: 2020-06-08

## 2020-06-08 RX ORDER — ONDANSETRON 2 MG/ML
4 INJECTION INTRAMUSCULAR; INTRAVENOUS EVERY 4 HOURS PRN
Status: DISCONTINUED | OUTPATIENT
Start: 2020-06-08 | End: 2020-06-10 | Stop reason: HOSPADM

## 2020-06-08 RX ORDER — AMOXICILLIN 250 MG
2 CAPSULE ORAL 2 TIMES DAILY
Status: DISCONTINUED | OUTPATIENT
Start: 2020-06-08 | End: 2020-06-10 | Stop reason: HOSPADM

## 2020-06-08 RX ORDER — PROMETHAZINE HYDROCHLORIDE 12.5 MG/1
12.5-25 SUPPOSITORY RECTAL EVERY 4 HOURS PRN
Status: DISCONTINUED | OUTPATIENT
Start: 2020-06-08 | End: 2020-06-10 | Stop reason: HOSPADM

## 2020-06-08 RX ORDER — PROMETHAZINE HYDROCHLORIDE 25 MG/1
12.5-25 TABLET ORAL EVERY 4 HOURS PRN
Status: DISCONTINUED | OUTPATIENT
Start: 2020-06-08 | End: 2020-06-10 | Stop reason: HOSPADM

## 2020-06-08 RX ORDER — NICOTINE 21 MG/24HR
14 PATCH, TRANSDERMAL 24 HOURS TRANSDERMAL
Status: DISCONTINUED | OUTPATIENT
Start: 2020-06-08 | End: 2020-06-10 | Stop reason: HOSPADM

## 2020-06-08 RX ORDER — PROCHLORPERAZINE EDISYLATE 5 MG/ML
5-10 INJECTION INTRAMUSCULAR; INTRAVENOUS EVERY 4 HOURS PRN
Status: DISCONTINUED | OUTPATIENT
Start: 2020-06-08 | End: 2020-06-10 | Stop reason: HOSPADM

## 2020-06-08 RX ORDER — LIDOCAINE HYDROCHLORIDE AND EPINEPHRINE 10; 10 MG/ML; UG/ML
INJECTION, SOLUTION INFILTRATION; PERINEURAL
Status: DISCONTINUED | OUTPATIENT
Start: 2020-06-08 | End: 2020-06-08 | Stop reason: HOSPADM

## 2020-06-08 RX ORDER — OXYCODONE HYDROCHLORIDE 5 MG/1
5 TABLET ORAL
Status: DISCONTINUED | OUTPATIENT
Start: 2020-06-08 | End: 2020-06-10 | Stop reason: HOSPADM

## 2020-06-08 RX ORDER — BISACODYL 10 MG
10 SUPPOSITORY, RECTAL RECTAL
Status: DISCONTINUED | OUTPATIENT
Start: 2020-06-08 | End: 2020-06-10 | Stop reason: HOSPADM

## 2020-06-08 RX ORDER — MORPHINE SULFATE 4 MG/ML
4 INJECTION, SOLUTION INTRAMUSCULAR; INTRAVENOUS ONCE
Status: ACTIVE | OUTPATIENT
Start: 2020-06-08 | End: 2020-06-09

## 2020-06-08 RX ORDER — OXYCODONE HYDROCHLORIDE 10 MG/1
10 TABLET ORAL
Status: DISCONTINUED | OUTPATIENT
Start: 2020-06-08 | End: 2020-06-10 | Stop reason: HOSPADM

## 2020-06-08 RX ORDER — CHLORHEXIDINE GLUCONATE ORAL RINSE 1.2 MG/ML
15 SOLUTION DENTAL 2 TIMES DAILY
Status: DISCONTINUED | OUTPATIENT
Start: 2020-06-08 | End: 2020-06-10 | Stop reason: HOSPADM

## 2020-06-08 RX ADMIN — SODIUM CHLORIDE 3 G: 900 INJECTION INTRAVENOUS at 18:17

## 2020-06-08 RX ADMIN — Medication 100 MG: at 21:11

## 2020-06-08 RX ADMIN — GLYCOPYRROLATE 0.2 MG: 0.2 INJECTION INTRAMUSCULAR; INTRAVENOUS at 21:16

## 2020-06-08 RX ADMIN — FENTANYL CITRATE 100 MCG: 50 INJECTION INTRAMUSCULAR; INTRAVENOUS at 21:25

## 2020-06-08 RX ADMIN — NICOTINE 14 MG: 14 PATCH TRANSDERMAL at 18:17

## 2020-06-08 RX ADMIN — ONDANSETRON 4 MG: 2 INJECTION INTRAMUSCULAR; INTRAVENOUS at 21:23

## 2020-06-08 RX ADMIN — LIDOCAINE HYDROCHLORIDE 100 MG: 20 INJECTION, SOLUTION EPIDURAL; INFILTRATION; INTRACAUDAL at 21:11

## 2020-06-08 RX ADMIN — MORPHINE SULFATE 4 MG: 4 INJECTION INTRAVENOUS at 11:26

## 2020-06-08 RX ADMIN — DEXAMETHASONE SODIUM PHOSPHATE 4 MG: 4 INJECTION, SOLUTION INTRA-ARTICULAR; INTRALESIONAL; INTRAMUSCULAR; INTRAVENOUS; SOFT TISSUE at 21:23

## 2020-06-08 RX ADMIN — PROPOFOL 200 MG: 10 INJECTION, EMULSION INTRAVENOUS at 21:11

## 2020-06-08 RX ADMIN — SODIUM CHLORIDE 3 G: 900 INJECTION INTRAVENOUS at 11:26

## 2020-06-08 RX ADMIN — ONDANSETRON 4 MG: 2 INJECTION INTRAMUSCULAR; INTRAVENOUS at 11:26

## 2020-06-08 RX ADMIN — IOHEXOL 75 ML: 350 INJECTION, SOLUTION INTRAVENOUS at 12:33

## 2020-06-08 RX ADMIN — SODIUM CHLORIDE, POTASSIUM CHLORIDE, SODIUM LACTATE AND CALCIUM CHLORIDE: 600; 310; 30; 20 INJECTION, SOLUTION INTRAVENOUS at 18:16

## 2020-06-08 ASSESSMENT — ENCOUNTER SYMPTOMS
BLURRED VISION: 0
COUGH: 0
CHILLS: 0
VOMITING: 0
SHORTNESS OF BREATH: 0
LOSS OF CONSCIOUSNESS: 0
HEADACHES: 0
PALPITATIONS: 0
DIARRHEA: 0
NAUSEA: 0
FEVER: 0
ABDOMINAL PAIN: 0
DOUBLE VISION: 0
DIZZINESS: 0
BACK PAIN: 0
SORE THROAT: 0

## 2020-06-08 ASSESSMENT — LIFESTYLE VARIABLES
HAVE YOU EVER FELT YOU SHOULD CUT DOWN ON YOUR DRINKING: NO
TOTAL SCORE: 0
CONSUMPTION TOTAL: INCOMPLETE
DOES PATIENT WANT TO STOP DRINKING: NO
HAVE PEOPLE ANNOYED YOU BY CRITICIZING YOUR DRINKING: NO
TOTAL SCORE: 0
EVER HAD A DRINK FIRST THING IN THE MORNING TO STEADY YOUR NERVES TO GET RID OF A HANGOVER: NO
TOTAL SCORE: 0
EVER FELT BAD OR GUILTY ABOUT YOUR DRINKING: NO
DO YOU DRINK ALCOHOL: NO

## 2020-06-08 ASSESSMENT — FIBROSIS 4 INDEX: FIB4 SCORE: 0.95

## 2020-06-08 NOTE — CONSULTS
Oral & Facial Surgery   Consultation Note    ID:  Aristides Kang     HPI:  Pt with multi day hx of pain and swelling on the right mandible. Visit to his dentist yielded abx, anf filed PO abx.  No presents with increswed swelling and pain.  OMFS consulted.      Chief Complaint:  Tooth pain and swelling    Exam:   Gen:  AAO x 3, NAD  Head:  NCAT  Eyes:  PERRLA, EOMI  Ears:  EAC Clear  Nose:  Nares patent, no d/c, no heme  Mouth:  jaquelin 25 Mm, multiple teeth with gross decay present.  Tenderness to palpation on teeth 30 - 32.  Swelling in area of right mandibular body (lateral).  Tissue locally erythematous, induration present  Throat:  OP Clear    Imaging:   Maxillofacial CT:  Abscess located at Right madibular body in buccal space.    Pano:  PARL on teeth 30 - 32.  Multile deayed and abscessed teeth.      Assessment:  Facial abscess 2/2 odontogenic infection.      Discussed R/B/A with pt regarding extractions with I&D.  Discussed risks such as pain, infection, bleeding, swelling, need for additional surgery, numbness, muscle weakness, nerve damage, as well as damage to adjacent structures.  Discussed possibility of penrose drain placement and need for good oral hygiene and cleaning/flushing of the drain.  Emphasized follow up.  All questions answered, and pt verbalized understanding and agreement.  Written consents obtained.      Plan:    To OR for I&D with exts      Thank You,  Raoul Shearer, DDS  [phone]

## 2020-06-08 NOTE — ED NOTES
Med rec completed   Allergies reviewed    Pt started a 7 day course of Clindamycin yesterday 6/7/2020

## 2020-06-08 NOTE — ASSESSMENT & PLAN NOTE
Abscesses noted on CT  Patient is protecting his airway  Oral surgery input is noted  S/p    Surgical Extraction of teeth # 30, 31 & 32              Alveoloplasty (1-3 teeth ):  LR quadrant              Complicated Intraoral I&D    On unasyn

## 2020-06-08 NOTE — ASSESSMENT & PLAN NOTE
Patient smokes 2 to 3 packs of cigarettes per week.  PRN replacement with [employer]  I counseled him x5 minutes on smoking cessation

## 2020-06-08 NOTE — ED NOTES
Patient up to restroom, no distress, steady gate, updated on hiccup with transport to room. Pt denies any frustration

## 2020-06-08 NOTE — ED TRIAGE NOTES
".  Chief Complaint   Patient presents with   • Dental Pain     right lower   • Facial Swelling     right     ./92   Pulse 85   Temp 36.3 °C (97.3 °F) (Temporal)   Resp 16   Ht 1.905 m (6' 3\")   Wt 67.4 kg (148 lb 9.4 oz)   SpO2 99%   BMI 18.57 kg/m²     Ambulatory to triage for above, seen at  yesterday for same, taking abx as prescribed and [place] for pain, patient reports increased swelling, patent airway, able to manage secretions, VSS on RA, patient wearing mask, denies respiratory complaints, educated on triage process, placed in lobby, told to inform staff of any changes in condition.   "

## 2020-06-08 NOTE — H&P
Hospital Medicine History & Physical Note    Date of Service  6/8/2020    Primary Care Physician  Pcp Pt States None    Code Status  Full Code    Chief Complaint  Chief Complaint   Patient presents with   • Dental Pain     right lower   • Facial Swelling     right       History of Presenting Illness  30 y.o. male who has a previous medical history that includes tobacco abuse.  Patient reports that about 3 days ago he started noticed pain in the lower portion of his right jaw towards the back by the angle.  He was seen at urgent care yesterday and given a prescription for clindamycin which he failed and started to take.  This morning when he woke up the pain was greater, and it has gotten more swollen.  Now he notes swelling about snf down his neck and tenderness all the way down to his clavicle.  He has not had any fever or chills, he is having no shortness of breath or difficulty swallowing.    Here in the ED a CT scan has been done which demonstrates an abscess at the angle of the jaw, there are also some dental caries.  Dr. Chapman of oral surgery has been consulted.    Review of Systems  Review of Systems   Constitutional: Negative for chills and fever.   HENT: Negative for nosebleeds and sore throat.         Facial swelling and pain   Eyes: Negative for blurred vision and double vision.   Respiratory: Negative for cough and shortness of breath.    Cardiovascular: Negative for chest pain, palpitations and leg swelling.   Gastrointestinal: Negative for abdominal pain, diarrhea, nausea and vomiting.   Genitourinary: Negative for dysuria and urgency.   Musculoskeletal: Negative for back pain.   Skin: Negative for rash.   Neurological: Negative for dizziness, loss of consciousness and headaches.       Past Medical History  Tobacco abuse    Surgical History  Chest tube    Family History  Reviewed but not relevant to presentation    Social History   reports that he has been smoking cigarettes. He has a 2.00  pack-year smoking history. He has never used smokeless tobacco. He reports current alcohol use. He reports that he does not use drugs.    Allergies  No Known Allergies    Medications  Prior to Admission Medications   Prescriptions Last Dose Informant Patient Reported? Taking?   HYDROcodone-acetaminophen (NORCO) 5-325 MG Tab per tablet 6/8/2020 at 0600  No No   Sig: Take 1 Tab by mouth every 6 hours as needed for up to 3 days.   clindamycin (CLEOCIN) 300 MG Cap 6/8/2020 at am  No No   Sig: Take 1 Cap by mouth 3 times a day for 7 days.      Facility-Administered Medications: None       Physical Exam  Temp:  [36.2 °C (97.2 °F)-36.3 °C (97.3 °F)] 36.2 °C (97.2 °F)  Pulse:  [70-88] 70  Resp:  [16-20] 20  BP: (120-130)/(82-92) 123/84  SpO2:  [97 %-99 %] 98 %    Physical Exam  Vitals signs reviewed.   Constitutional:       General: He is not in acute distress.     Appearance: He is well-developed. He is not diaphoretic.   HENT:      Head: Normocephalic and atraumatic.      Mouth/Throat:      Comments: Patient has several caries noted.  There is an area of firmness right next to the right angle of the jaw, with erythema that extends about 2 to 3 cm down the neck and tenderness to palpation distal to that.  Patient is protecting his airway, the tongue lies in the base of the mouth, he is handling secretions there is no stridor.  Is able to speak clearly without difficulty  Eyes:      Conjunctiva/sclera: Conjunctivae normal.   Neck:      Vascular: No JVD.   Cardiovascular:      Rate and Rhythm: Normal rate.      Heart sounds: No murmur. No gallop.    Pulmonary:      Effort: Pulmonary effort is normal. No respiratory distress.      Breath sounds: No stridor. No wheezing or rales.   Abdominal:      Palpations: Abdomen is soft.      Tenderness: There is no abdominal tenderness. There is no guarding or rebound.   Skin:     General: Skin is warm and dry.      Findings: No rash.   Neurological:      General: No focal deficit  present.      Mental Status: He is oriented to person, place, and time. Mental status is at baseline.   Psychiatric:         Mood and Affect: Mood normal.         Thought Content: Thought content normal.         Laboratory:  Recent Labs     06/08/20  1115   WBC 10.5   RBC 4.69*   HEMOGLOBIN 15.1   HEMATOCRIT 44.5   MCV 94.9   MCH 32.2   MCHC 33.9   RDW 43.8   PLATELETCT 225   MPV 9.1     Recent Labs     06/08/20  1115   SODIUM 136   POTASSIUM 4.1   CHLORIDE 100   CO2 23   GLUCOSE 87   BUN 6*   CREATININE 0.68   CALCIUM 9.1     Recent Labs     06/08/20  1115   ALTSGPT 16   ASTSGOT 11*   ALKPHOSPHAT 57   TBILIRUBIN 0.6   GLUCOSE 87         No results for input(s): NTPROBNP in the last 72 hours.      No results for input(s): TROPONINT in the last 72 hours.    Imaging:  CT-MAXILLOFACIAL WITH PLUS RECONS   Final Result      1.  12 mm in diameter low-attenuation area adjacent to the lateral aspect of the right aspect of the mandible at the junction of the body and ramus suspicious for perimandibular abscess.      2.  Associated asymmetric swelling of the right masseter muscle and induration of the subcutaneous fat in the right aspect of the face consistent with cellulitis.      3.  Dental caries involving multiple right-sided mandibular molars and several left-sided mandibular molars.      4.  No mandibular fracture.            Assessment/Plan:      Tobacco abuse  Assessment & Plan  Patient smokes 2 to 3 packs of cigarettes per week.  PRN replacement with [employer]  I counseled him x5 minutes on smoking cessation    Oral abscess  Assessment & Plan  Abscesses noted on CT  Patient is protecting his airway  Oral surgery has been consulted   maintain n.p.o.  Start IV Unasyn  Continue supportive care with antiemetics and pain medication

## 2020-06-08 NOTE — OP REPORT
Operative Report  Oral & Facial Surgery  Dental Extractions    Pt Name:  Aristides Kang     Date of Surgery: 6/8/2020     Surgeon:  Raoul Shearer DDS    Assistant: None    Pre-Op Diagnosis:     Dental Decay on Teeth #s 30, 31 & 32              Mandibular Abscess    Post Op Diagnosis:       Same    Operation Performed:           Surgical Extraction of teeth # 30, 31 & 32   Alveoloplasty (1-3 teeth ):  LR quadrant   Complicated Intraoral I&D    Description of Surgery    The pt was brought to the operating room by the anesthesia team.  A time out was performed and consents were verified.  The pt was then uneventfully induced into general anesthesia.  A endotracheal tube was inserted and secured by the anesthesia team.  An oropharyngeal throat pack was placed.  approximately 15 mL of 1% lidocaine with 1:100K epi was administered to the proposed surgical sites.     A 15 blade was used to make an incision on the mandibualr right vestibule in the area of #31.  Blunt dissection was then carried out to the bone and purulence was encountered.  Copious irrigation was placed in the wound until clear return was encountered.      A 15 blade was used to make a sulcular incision along the sites to be extracted.  A full thickness mucoperiosteal flap was elevated.  Teeth #s 30, 31 & 32 were removed with appropriate bone removal and sectioning with forceps and elevators.  Rongeurs and bone files were used to smooth the alveolus of the bone.  Copious irrigation was placed in the surgical sites.      The throat pack was then removed and hemostasis achieved.  The pt was then turned back over to the anesthesia team, was awakened and extubated uneventfully and taken to the PACU in stable condition.      Estimated Blood Loss:  15 mL  Needle and sponge count:  Correct  Specimens Removed:  Teeth #s 30, 31 & 32    Raoul Shearer DDS

## 2020-06-08 NOTE — ED PROVIDER NOTES
"ED Provider Note    CHIEF COMPLAINT  Chief Complaint   Patient presents with   • Dental Pain     right lower   • Facial Swelling     right       HPI  Aristides Kang is a 30 y.o. male for evaluation of right-sided facial and jaw pain.  Patient states that he was seen and evaluated yesterday at the urgent care for the same, and stated that his right-sided lower jaw pain was confined to the jaw.  He was started on clindamycin, and sent home.  This morning when he woke up he noted some right-sided facial and neck pain.  The patient states that it is been increasing in size, and is able to swallow and breathe without difficulty, but his voice is changed a bit.  He has no fever chills, no vomiting, and no chest pain or shortness of breath.  He has no back pain or abdominal pain.  Patient states he took his dose of antibiotics yesterday, but states that it is \"getting worse.\"  He does have some discomfort and fullness to the right side of his face, and some mild aching pain.  He states it is because he has \"too bad lower teeth.\"    ROS;  Please see HPI  O/W negative     PAST MEDICAL HISTORY   No bleeding disorders    SOCIAL HISTORY  Social History     Tobacco Use   • Smoking status: Current Every Day Smoker     Packs/day: 0.50     Years: 4.00     Pack years: 2.00     Types: Cigarettes   • Smokeless tobacco: Never Used   Substance and Sexual Activity   • Alcohol use: Yes     Comment: occ   • Drug use: No   • Sexual activity: Not on file       SURGICAL HISTORY  patient denies any surgical history    CURRENT MEDICATIONS  Home Medications     Reviewed by Aislinn La R.N. (Registered Nurse) on 06/08/20 at 1028  Med List Status: Complete   Medication Last Dose Status   clindamycin (CLEOCIN) 300 MG Cap 6/8/2020 Active   HYDROcodone-acetaminophen (NORCO) 5-325 MG Tab per tablet 6/8/2020 Active   ketorolac (TORADOL) 10 MG Tab  Active                ALLERGIES  No Known Allergies    REVIEW OF SYSTEMS  See HPI for " "further details. Review of systems as above, otherwise all other systems are negative.     PHYSICAL EXAM  VITAL SIGNS: /92   Pulse 85   Temp 36.3 °C (97.3 °F) (Temporal)   Resp 16   Ht 1.905 m (6' 3\")   Wt 67.4 kg (148 lb 9.4 oz)   SpO2 99%   BMI 18.57 kg/m²     Constitutional: Well developed, well nourished. No acute distress.  HEENT: Normocephalic. MMM, edema to the right lower angle of the mandible extending down to the lateral neck.  No drooling.  Neck: Supple, Full range of motion   Chest/Pulmonary:  No respiratory distress.  Equal expansion   Musculoskeletal: No deformity, no edema, neurovascular intact.   Neuro: Clear speech, appropriate, cooperative, cranial nerves II-XII grossly intact.  Psych: Normal mood and affect      Results for orders placed or performed during the hospital encounter of 06/08/20   CBC WITH DIFFERENTIAL   Result Value Ref Range    WBC 10.5 4.8 - 10.8 K/uL    RBC 4.69 (L) 4.70 - 6.10 M/uL    Hemoglobin 15.1 14.0 - 18.0 g/dL    Hematocrit 44.5 42.0 - 52.0 %    MCV 94.9 81.4 - 97.8 fL    MCH 32.2 27.0 - 33.0 pg    MCHC 33.9 33.7 - 35.3 g/dL    RDW 43.8 35.9 - 50.0 fL    Platelet Count 225 164 - 446 K/uL    MPV 9.1 9.0 - 12.9 fL    Neutrophils-Polys 66.10 44.00 - 72.00 %    Lymphocytes 21.10 (L) 22.00 - 41.00 %    Monocytes 11.50 0.00 - 13.40 %    Eosinophils 0.70 0.00 - 6.90 %    Basophils 0.10 0.00 - 1.80 %    Immature Granulocytes 0.50 0.00 - 0.90 %    Nucleated RBC 0.00 /100 WBC    Neutrophils (Absolute) 6.95 1.82 - 7.42 K/uL    Lymphs (Absolute) 2.22 1.00 - 4.80 K/uL    Monos (Absolute) 1.21 (H) 0.00 - 0.85 K/uL    Eos (Absolute) 0.07 0.00 - 0.51 K/uL    Baso (Absolute) 0.01 0.00 - 0.12 K/uL    Immature Granulocytes (abs) 0.05 0.00 - 0.11 K/uL    NRBC (Absolute) 0.00 K/uL   COMP METABOLIC PANEL   Result Value Ref Range    Sodium 136 135 - 145 mmol/L    Potassium 4.1 3.6 - 5.5 mmol/L    Chloride 100 96 - 112 mmol/L    Co2 23 20 - 33 mmol/L    Anion Gap 13.0 7.0 - 16.0    " Glucose 87 65 - 99 mg/dL    Bun 6 (L) 8 - 22 mg/dL    Creatinine 0.68 0.50 - 1.40 mg/dL    Calcium 9.1 8.5 - 10.5 mg/dL    AST(SGOT) 11 (L) 12 - 45 U/L    ALT(SGPT) 16 2 - 50 U/L    Alkaline Phosphatase 57 30 - 99 U/L    Total Bilirubin 0.6 0.1 - 1.5 mg/dL    Albumin 4.3 3.2 - 4.9 g/dL    Total Protein 7.4 6.0 - 8.2 g/dL    Globulin 3.1 1.9 - 3.5 g/dL    A-G Ratio 1.4 g/dL   ESTIMATED GFR   Result Value Ref Range    GFR If African American >60 >60 mL/min/1.73 m 2    GFR If Non African American >60 >60 mL/min/1.73 m 2     CT-MAXILLOFACIAL WITH PLUS RECONS   Final Result      1.  12 mm in diameter low-attenuation area adjacent to the lateral aspect of the right aspect of the mandible at the junction of the body and ramus suspicious for perimandibular abscess.      2.  Associated asymmetric swelling of the right masseter muscle and induration of the subcutaneous fat in the right aspect of the face consistent with cellulitis.      3.  Dental caries involving multiple right-sided mandibular molars and several left-sided mandibular molars.      4.  No mandibular fracture.              PROCEDURES     MEDICAL RECORD  I have reviewed patient's medical record and pertinent results are listed.    COURSE & MEDICAL DECISION MAKING  I have reviewed any medical record information, laboratory studies and radiographic results as noted above.    1:06 PM  The pt will be seen by Dr. Shearer on for oral surgery.  He will see as consult, and would a panarex and the pt to be npo.   We have started abx here (unasyn) and he will be admitted to the hospitalist, Dr. Orly Montague primarily.      FINAL IMPRESSION  Facial abscess  Facial cellulitis       Electronically signed by: Parth Schafer D.O., 6/8/2020 11:06 AM

## 2020-06-08 NOTE — PROGRESS NOTES
30 yearar-old male seen in urgent care yesterday for dental infection and started on clindamycin presenting with worsening symptoms.  ERP consulting Dr. Hastings from oral surgery. CT .Facial abscess and cellulitis    Dr De León will admit patient.

## 2020-06-08 NOTE — PROGRESS NOTES
Pt tolerated extractions and I&D well. Excellent prognosis for recovery . When pt is medically stable, OK to dc from OMFS standpoint.  Pt can f/u with my office in 1 week . Recommend dc with Augmentin, pain meds and peridex.      Thank you,  Raoul Shearer, DDS  [phone]

## 2020-06-09 LAB
ANION GAP SERPL CALC-SCNC: 12 MMOL/L (ref 7–16)
BUN SERPL-MCNC: 7 MG/DL (ref 8–22)
CALCIUM SERPL-MCNC: 9.1 MG/DL (ref 8.5–10.5)
CHLORIDE SERPL-SCNC: 100 MMOL/L (ref 96–112)
CO2 SERPL-SCNC: 25 MMOL/L (ref 20–33)
CREAT SERPL-MCNC: 0.73 MG/DL (ref 0.5–1.4)
ERYTHROCYTE [DISTWIDTH] IN BLOOD BY AUTOMATED COUNT: 43.6 FL (ref 35.9–50)
GLUCOSE SERPL-MCNC: 116 MG/DL (ref 65–99)
HCT VFR BLD AUTO: 41.8 % (ref 42–52)
HGB BLD-MCNC: 14.4 G/DL (ref 14–18)
MCH RBC QN AUTO: 32.7 PG (ref 27–33)
MCHC RBC AUTO-ENTMCNC: 34.4 G/DL (ref 33.7–35.3)
MCV RBC AUTO: 95 FL (ref 81.4–97.8)
PLATELET # BLD AUTO: 218 K/UL (ref 164–446)
PMV BLD AUTO: 9.3 FL (ref 9–12.9)
POTASSIUM SERPL-SCNC: 4.6 MMOL/L (ref 3.6–5.5)
RBC # BLD AUTO: 4.4 M/UL (ref 4.7–6.1)
SODIUM SERPL-SCNC: 137 MMOL/L (ref 135–145)
WBC # BLD AUTO: 13.2 K/UL (ref 4.8–10.8)

## 2020-06-09 PROCEDURE — 36415 COLL VENOUS BLD VENIPUNCTURE: CPT

## 2020-06-09 PROCEDURE — 85027 COMPLETE CBC AUTOMATED: CPT

## 2020-06-09 PROCEDURE — 700111 HCHG RX REV CODE 636 W/ 250 OVERRIDE (IP): Performed by: HOSPITALIST

## 2020-06-09 PROCEDURE — 99232 SBSQ HOSP IP/OBS MODERATE 35: CPT | Performed by: FAMILY MEDICINE

## 2020-06-09 PROCEDURE — A9270 NON-COVERED ITEM OR SERVICE: HCPCS | Performed by: HOSPITALIST

## 2020-06-09 PROCEDURE — 700105 HCHG RX REV CODE 258: Performed by: HOSPITALIST

## 2020-06-09 PROCEDURE — 770009 HCHG ROOM/CARE - ONCOLOGY SEMI PRI*

## 2020-06-09 PROCEDURE — 700102 HCHG RX REV CODE 250 W/ 637 OVERRIDE(OP): Performed by: DENTIST

## 2020-06-09 PROCEDURE — 80048 BASIC METABOLIC PNL TOTAL CA: CPT

## 2020-06-09 PROCEDURE — 700102 HCHG RX REV CODE 250 W/ 637 OVERRIDE(OP): Performed by: HOSPITALIST

## 2020-06-09 PROCEDURE — A9270 NON-COVERED ITEM OR SERVICE: HCPCS | Performed by: DENTIST

## 2020-06-09 RX ADMIN — CHLORHEXIDINE GLUCONATE 0.12% ORAL RINSE 15 ML: 1.2 LIQUID ORAL at 00:37

## 2020-06-09 RX ADMIN — NICOTINE 14 MG: 14 PATCH TRANSDERMAL at 05:03

## 2020-06-09 RX ADMIN — SODIUM CHLORIDE 3 G: 900 INJECTION INTRAVENOUS at 17:34

## 2020-06-09 RX ADMIN — DOCUSATE SODIUM 50 MG AND SENNOSIDES 8.6 MG 2 TABLET: 8.6; 5 TABLET, FILM COATED ORAL at 05:03

## 2020-06-09 RX ADMIN — SODIUM CHLORIDE 3 G: 900 INJECTION INTRAVENOUS at 12:06

## 2020-06-09 RX ADMIN — DOCUSATE SODIUM 50 MG AND SENNOSIDES 8.6 MG 2 TABLET: 8.6; 5 TABLET, FILM COATED ORAL at 17:34

## 2020-06-09 RX ADMIN — CHLORHEXIDINE GLUCONATE 0.12% ORAL RINSE 15 ML: 1.2 LIQUID ORAL at 05:02

## 2020-06-09 RX ADMIN — SODIUM CHLORIDE 3 G: 900 INJECTION INTRAVENOUS at 05:03

## 2020-06-09 RX ADMIN — SODIUM CHLORIDE, POTASSIUM CHLORIDE, SODIUM LACTATE AND CALCIUM CHLORIDE: 600; 310; 30; 20 INJECTION, SOLUTION INTRAVENOUS at 20:30

## 2020-06-09 RX ADMIN — SODIUM CHLORIDE 3 G: 900 INJECTION INTRAVENOUS at 00:37

## 2020-06-09 ASSESSMENT — COGNITIVE AND FUNCTIONAL STATUS - GENERAL
DAILY ACTIVITIY SCORE: 24
SUGGESTED CMS G CODE MODIFIER MOBILITY: CH
MOBILITY SCORE: 24
SUGGESTED CMS G CODE MODIFIER DAILY ACTIVITY: CH

## 2020-06-09 ASSESSMENT — ENCOUNTER SYMPTOMS
NAUSEA: 0
VOMITING: 0
PALPITATIONS: 0
DOUBLE VISION: 0
WEIGHT LOSS: 0
DIZZINESS: 0
PHOTOPHOBIA: 0
HEADACHES: 0
SPUTUM PRODUCTION: 0
HEARTBURN: 0
ORTHOPNEA: 0
BLURRED VISION: 0
CHILLS: 0
NECK PAIN: 0
TINGLING: 0
COUGH: 0
FEVER: 0
MYALGIAS: 0
HEMOPTYSIS: 0
BACK PAIN: 0

## 2020-06-09 ASSESSMENT — LIFESTYLE VARIABLES
EVER HAD A DRINK FIRST THING IN THE MORNING TO STEADY YOUR NERVES TO GET RID OF A HANGOVER: NO
HOW MANY TIMES IN THE PAST YEAR HAVE YOU HAD 5 OR MORE DRINKS IN A DAY: 0
HAVE PEOPLE ANNOYED YOU BY CRITICIZING YOUR DRINKING: NO
HAVE YOU EVER FELT YOU SHOULD CUT DOWN ON YOUR DRINKING: NO
EVER FELT BAD OR GUILTY ABOUT YOUR DRINKING: NO
CONSUMPTION TOTAL: NEGATIVE
TOTAL SCORE: 0
TOTAL SCORE: 0
ALCOHOL_USE: YES
AVERAGE NUMBER OF DAYS PER WEEK YOU HAVE A DRINK CONTAINING ALCOHOL: 1
ON A TYPICAL DAY WHEN YOU DRINK ALCOHOL HOW MANY DRINKS DO YOU HAVE: 2
TOTAL SCORE: 0
DOES PATIENT WANT TO STOP DRINKING: NO

## 2020-06-09 NOTE — CARE PLAN
Problem: Communication  Goal: The ability to communicate needs accurately and effectively will improve  Outcome: PROGRESSING AS EXPECTED  Note: Patient able to communicate needs accurately and effectively as they arise.      Problem: Safety  Goal: Will remain free from falls  Outcome: PROGRESSING AS EXPECTED  Note: Patient a no fall risk - Bed alarm in place and sounding r/t post procedure this PM. Patient calls appropriately for assistance; Bed locked and in lowest position.

## 2020-06-09 NOTE — ANESTHESIA PROCEDURE NOTES
Airway    Date/Time: 6/8/2020 9:11 PM  Performed by: Be Pena M.D.  Authorized by: Be Pena M.D.     Location:  OR  Urgency:  Elective  Indications for Airway Management:  Anesthesia      Spontaneous Ventilation: absent    Sedation Level:  Deep  Preoxygenated: Yes    Patient Position:  Sniffing  Final Airway Type:  Endotracheal airway  Final Endotracheal Airway:  ETT  Cuffed: Yes    Technique Used for Successful ETT Placement:  Direct laryngoscopy    Insertion Site:  Oral  Blade Type:  Ramirez  Laryngoscope Blade/Videolaryngoscope Blade Size:  3  ETT Size (mm):  8.0  Measured from:  Teeth  ETT to Teeth (cm):  24  Placement Verified by: auscultation and capnometry    Cormack-Lehane Classification:  Grade I - full view of glottis  Number of Attempts at Approach:  1

## 2020-06-09 NOTE — PROGRESS NOTES
Received report and assumed care of patient at 1900. AOx4. Pre-op checklist complete, fluids paused and disconnected from pt, patient ready for transport. Transported to OR from floor at 2015.

## 2020-06-09 NOTE — PROGRESS NOTES
Received report from Iza GUILLEN. Patient back to floor from OR at 2215. AOx4. VSS,  on, Fluids resumed, Pt reports minimal pain, Pt resting in bed.

## 2020-06-09 NOTE — PROGRESS NOTES
Received report and assumed care of patient at 1900. AOx4; SBA - not a fall risk, bed alarm in place r/t post procedure, patient verbalizes understanding. NPO prior to procedure. Left PIV flushes, no blood return noted, fluids infusing. No c/o nausea. Pt reports minimal pain, declines intervention. POC discussed with patient, all questions and concerns addressed; Bed locked and in lowest position; Alarms active and sounding; Call light and personal belongings within reach; Hourly rounding in place.

## 2020-06-09 NOTE — DISCHARGE PLANNING
Care Transition Team Assessment    LSW met with Pt at bedside and completed assessment.  Pt reported he lives in a 2nd story apartment with his spouse and mother.  Pt does not report a Hx of substance abuse or mental health issues.  Pt is uninsured.  LSW sent an email to [employer] requesting a Medicaid screening.  Pt does not have a PCP.  LSW provided Pt with PCP resources that offer a sliding fee scale.              Information Source  Orientation : Oriented x 4  Information Given By: Patient  Informant's Name: Aristides Kang  Who is responsible for making decisions for patient? : Patient    Readmission Evaluation  Is this a readmission?: No    Elopement Risk  Legal Hold: No  Ambulatory or Self Mobile in Wheelchair: Yes  Disoriented: No  Psychiatric Symptoms: None  History of Wandering: No  Elopement this Admit: No  Vocalizing Wanting to Leave: No  Displays Behaviors, Body Language Wanting to Leave: No-Not at Risk for Elopement  Elopement Risk: Not at Risk for Elopement    Interdisciplinary Discharge Planning  Patient or legal guardian wants to designate a caregiver (see row info): No    Discharge Preparedness  What is your plan after discharge?: Home with help  What are your discharge supports?: Parent, Spouse  Prior Functional Level: Ambulatory, Drives Self, Independent with Activities of Daily Living, Independent with Medication Management  Difficulity with ADLs: None  Difficulity with IADLs: None    Functional Assesment  Prior Functional Level: Ambulatory, Drives Self, Independent with Activities of Daily Living, Independent with Medication Management    Finances  Financial Barriers to Discharge: No  Prescription Coverage: No  Prescription Coverage Comments: Uninsured     Vision / Hearing Impairment  Vision Impairment : No  Hearing Impairment : No              Domestic Abuse  Have you ever been the victim of abuse or violence?: No  Physical Abuse or Sexual Abuse: No  Verbal Abuse or Emotional Abuse: No  Possible Abuse  Reported to:: Not Applicable    Psychological Assessment  History of Substance Abuse: None  History of Psychiatric Problems: No    Discharge Risks or Barriers  Discharge risks or barriers?: No PCP, Uninsured / underinsured, No  Patient risk factors: No PCP, Uninsured or underinsured    Anticipated Discharge Information  Anticipated discharge disposition: Home

## 2020-06-09 NOTE — ANESTHESIA POSTPROCEDURE EVALUATION
Patient: Aristides Kang    Procedure Summary     Date:  06/08/20 Room / Location:  [place] [address] / SURGERY [facility]    Anesthesia Start:  2105 Anesthesia Stop:  2137    Procedure:  Right mandibular Incision and Drainage and Tooth extraction numbers 30,31,32 (Mouth) Diagnosis:  (Facial abscess 2/2 odontogenic infection.  )    Surgeon:  Raoul Shearer D.D.S. Responsible Provider:  Be Pena M.D.    Anesthesia Type:  general ASA Status:  2          Final Anesthesia Type: general  Last vitals  BP   Blood Pressure: 120/81    Temp   37.6 °C (99.6 °F)    Pulse   Pulse: 73   Resp   18    SpO2   94 %      Anesthesia Post Evaluation    Patient location during evaluation: PACU  Patient participation: complete - patient participated  Level of consciousness: awake and alert    Airway patency: patent  Anesthetic complications: no  Cardiovascular status: hemodynamically stable  Respiratory status: acceptable  Hydration status: euvolemic    PONV: none           Nurse Pain Score: 0 (NPRS)

## 2020-06-09 NOTE — DIETARY
"Nutrition services: Day 1 of admit.  Aristides Kang is a 30 y.o. male with admitting DX of oral abscess s/p surgical extraction of teeth, alveoloplasty, intraoral I&D  History includes: tobacco abuse  Low end of BMI noted on admit screen    Assessment:  Height: 190.5 cm (6' 3\")  Weight: 67.4 kg (148 lb 9.4 oz)  Body mass index is 18.57 kg/m². on low end of normal  Diet/Intake: low fiber - no PO intake records yet noted.    Evaluation:   1. No weight loss or poor PO intake noted on admission screen  2. Lactated ringers running at 100 ml/hr per MAR    Malnutrition Risk: no criteria met at this time    Recommendations/Inteventions/Plan:     1. Encourage intake of PO intake as tolerated with possible mouth pain  2. Document intake of all PO as % taken in ADL's to provide interdisciplinary communication across all shifts.   3. Monitor weight.  4. Nutrition rep will continue to see patient for ongoing meal and snack preferences.     RD following for adequacy of PO intake.  ,    "

## 2020-06-09 NOTE — ANESTHESIA PREPROCEDURE EVALUATION
Relevant Problems   No relevant active problems     Oral abscess  Smoker    Physical Exam    Airway   Mallampati: IV  TM distance: <3 FB  Neck ROM: full       Cardiovascular   Rhythm: regular  Rate: normal     Dental       Very poor dentition  Facial Hair   Pulmonary   Breath sounds clear to auscultation     Abdominal    Neurological - normal exam                 Anesthesia Plan    ASA 2       Plan - general       Airway plan will be ETT        Induction: intravenous      Pertinent diagnostic labs and testing reviewed    Informed Consent:    Anesthetic plan and risks discussed with patient.

## 2020-06-09 NOTE — CARE PLAN
Problem: Infection  Goal: Will remain free from infection  Outcome: PROGRESSING AS EXPECTED     Problem: Discharge Barriers/Planning  Goal: Patient's continuum of care needs will be met  Outcome: PROGRESSING AS EXPECTED     Problem: Pain Management  Goal: Pain level will decrease to patient's comfort goal  Outcome: PROGRESSING AS EXPECTED

## 2020-06-09 NOTE — OR NURSING
Pt in recovery, denies pain at this time, medicated for nausea, pt requests no update phone calls made on his behalf at this time, plan to send pt back to room.

## 2020-06-09 NOTE — ANESTHESIA TIME REPORT
Anesthesia Start and Stop Event Times     Date Time Event    6/8/2020 2102 Ready for Procedure     2105 Anesthesia Start     2137 Anesthesia Stop        Responsible Staff  06/08/20    Name Role Begin End    Be Pena M.D. Anesth 2105 2137        Preop Diagnosis (Free Text):  Pre-op Diagnosis     Facial abscess 2/2 odontogenic infection.          Preop Diagnosis (Codes):    Post op Diagnosis  Facial abscess      Premium Reason  A. 3PM - 7AM    Comments:

## 2020-06-09 NOTE — PROGRESS NOTES
[facility] Medicine Daily Progress Note    Date of Service  6/9/2020    Chief Complaint  30 y.o. male admitted 6/8/2020 with pain in the lower portion of his right jaw     Hospital Course    30 y.o. male who has a previous medical history that includes tobacco abuse.  Patient reports that about 3 days ago he started noticed pain in the lower portion of his right jaw towards the back by the angle.  He was seen at urgent care yesterday and given a prescription for clindamycin which he failed and started to take.  This morning when he woke up the pain was greater, and it has gotten more swollen.  Now he notes swelling about senior living down his neck and tenderness all the way down to his clavicle.  He has not had any fever or chills, he is having no shortness of breath or difficulty swallowing.     Here in the ED a CT scan has been done which demonstrates an abscess at the angle of the jaw, there are also some dental caries.  Dr. Chapman of oral surgery has been consulted.    Interval Problem Update    S/p    Surgical Extraction of teeth # 30, 31 & 32              Alveoloplasty (1-3 teeth ):  LR quadrant              Complicated Intraoral I&D    On unasyn    Consultants/Specialty  Oral surgery    Code Status  full    Disposition  pending    Review of Systems  Review of Systems   Constitutional: Negative for chills, fever and weight loss.   HENT: Negative for ear discharge, ear pain and tinnitus.    Eyes: Negative for blurred vision, double vision and photophobia.   Respiratory: Negative for cough, hemoptysis and sputum production.    Cardiovascular: Negative for chest pain, palpitations and orthopnea.   Gastrointestinal: Negative for heartburn, nausea and vomiting.   Genitourinary: Negative for dysuria, frequency and urgency.   Musculoskeletal: Negative for back pain, myalgias and neck pain.   Skin: Negative for itching and rash.   Neurological: Negative for dizziness, tingling and headaches.        Physical Exam  Temp:  [36.8  °C (98.2 °F)-38.3 °C (101 °F)] 37.4 °C (99.3 °F)  Pulse:  [] 92  Resp:  [16-20] 18  BP: (112-139)/(57-92) 116/76  SpO2:  [94 %-99 %] 98 %    Physical Exam  Constitutional:       Appearance: Normal appearance.   HENT:      Head: Normocephalic and atraumatic.      Nose: Nose normal.      Mouth/Throat:      Mouth: Mucous membranes are moist.      Comments: Unable to fully open the mouth  Neck:      Musculoskeletal: Normal range of motion and neck supple.   Cardiovascular:      Rate and Rhythm: Normal rate and regular rhythm.      Pulses: Normal pulses.      Heart sounds: Normal heart sounds.   Pulmonary:      Effort: Pulmonary effort is normal.      Breath sounds: Normal breath sounds.   Abdominal:      General: Abdomen is flat.      Palpations: Abdomen is soft.   Musculoskeletal: Normal range of motion.   Skin:     General: Skin is warm and dry.   Neurological:      General: No focal deficit present.      Mental Status: He is alert and oriented to person, place, and time.         Fluids    Intake/Output Summary (Last 24 hours) at 6/9/2020 1236  Last data filed at 6/8/2020 2137  Gross per 24 hour   Intake 300 ml   Output --   Net 300 ml       Laboratory  Recent Labs     06/08/20  1115 06/09/20  0042   WBC 10.5 13.2*   RBC 4.69* 4.40*   HEMOGLOBIN 15.1 14.4   HEMATOCRIT 44.5 41.8*   MCV 94.9 95.0   MCH 32.2 32.7   MCHC 33.9 34.4   RDW 43.8 43.6   PLATELETCT 225 218   MPV 9.1 9.3     Recent Labs     06/08/20  1115 06/09/20  0042   SODIUM 136 137   POTASSIUM 4.1 4.6   CHLORIDE 100 100   CO2 23 25   GLUCOSE 87 116*   BUN 6* 7*   CREATININE 0.68 0.73   CALCIUM 9.1 9.1                   Imaging       Assessment/Plan  Tobacco abuse  Assessment & Plan  Patient smokes 2 to 3 packs of cigarettes per week.  PRN replacement with NicoDerm  I counseled him x5 minutes on smoking cessation    Oral abscess  Assessment & Plan  Abscesses noted on CT  Patient is protecting his airway  Oral surgery input is noted  S/p    Surgical  Extraction of teeth # 30, 31 & 32              Alveoloplasty (1-3 teeth ):  LR quadrant              Complicated Intraoral I&D    On unasyn       VTE prophylaxis: scd

## 2020-06-10 VITALS
HEART RATE: 90 BPM | WEIGHT: 148.59 LBS | HEIGHT: 75 IN | TEMPERATURE: 98.6 F | RESPIRATION RATE: 18 BRPM | DIASTOLIC BLOOD PRESSURE: 66 MMHG | BODY MASS INDEX: 18.48 KG/M2 | SYSTOLIC BLOOD PRESSURE: 108 MMHG | OXYGEN SATURATION: 98 %

## 2020-06-10 LAB
ALBUMIN SERPL BCP-MCNC: 3.6 G/DL (ref 3.2–4.9)
ALBUMIN/GLOB SERPL: 1.4 G/DL
ALP SERPL-CCNC: 45 U/L (ref 30–99)
ALT SERPL-CCNC: 12 U/L (ref 2–50)
ANION GAP SERPL CALC-SCNC: 12 MMOL/L (ref 7–16)
AST SERPL-CCNC: 14 U/L (ref 12–45)
BASOPHILS # BLD AUTO: 0.2 % (ref 0–1.8)
BASOPHILS # BLD: 0.01 K/UL (ref 0–0.12)
BILIRUB SERPL-MCNC: 0.4 MG/DL (ref 0.1–1.5)
BUN SERPL-MCNC: 11 MG/DL (ref 8–22)
CALCIUM SERPL-MCNC: 8.6 MG/DL (ref 8.5–10.5)
CHLORIDE SERPL-SCNC: 102 MMOL/L (ref 96–112)
CO2 SERPL-SCNC: 26 MMOL/L (ref 20–33)
CREAT SERPL-MCNC: 0.72 MG/DL (ref 0.5–1.4)
EOSINOPHIL # BLD AUTO: 0.04 K/UL (ref 0–0.51)
EOSINOPHIL NFR BLD: 0.6 % (ref 0–6.9)
ERYTHROCYTE [DISTWIDTH] IN BLOOD BY AUTOMATED COUNT: 42.1 FL (ref 35.9–50)
GLOBULIN SER CALC-MCNC: 2.6 G/DL (ref 1.9–3.5)
GLUCOSE SERPL-MCNC: 103 MG/DL (ref 65–99)
HCT VFR BLD AUTO: 38 % (ref 42–52)
HGB BLD-MCNC: 13.2 G/DL (ref 14–18)
IMM GRANULOCYTES # BLD AUTO: 0.02 K/UL (ref 0–0.11)
IMM GRANULOCYTES NFR BLD AUTO: 0.3 % (ref 0–0.9)
LYMPHOCYTES # BLD AUTO: 2.45 K/UL (ref 1–4.8)
LYMPHOCYTES NFR BLD: 38 % (ref 22–41)
MCH RBC QN AUTO: 32.4 PG (ref 27–33)
MCHC RBC AUTO-ENTMCNC: 34.7 G/DL (ref 33.7–35.3)
MCV RBC AUTO: 93.1 FL (ref 81.4–97.8)
MONOCYTES # BLD AUTO: 0.63 K/UL (ref 0–0.85)
MONOCYTES NFR BLD AUTO: 9.8 % (ref 0–13.4)
NEUTROPHILS # BLD AUTO: 3.29 K/UL (ref 1.82–7.42)
NEUTROPHILS NFR BLD: 51.1 % (ref 44–72)
NRBC # BLD AUTO: 0 K/UL
NRBC BLD-RTO: 0 /100 WBC
PLATELET # BLD AUTO: 220 K/UL (ref 164–446)
PMV BLD AUTO: 9.3 FL (ref 9–12.9)
POTASSIUM SERPL-SCNC: 3.8 MMOL/L (ref 3.6–5.5)
PROT SERPL-MCNC: 6.2 G/DL (ref 6–8.2)
RBC # BLD AUTO: 4.08 M/UL (ref 4.7–6.1)
SODIUM SERPL-SCNC: 140 MMOL/L (ref 135–145)
WBC # BLD AUTO: 6.4 K/UL (ref 4.8–10.8)

## 2020-06-10 PROCEDURE — 80053 COMPREHEN METABOLIC PANEL: CPT

## 2020-06-10 PROCEDURE — A9270 NON-COVERED ITEM OR SERVICE: HCPCS | Performed by: DENTIST

## 2020-06-10 PROCEDURE — A9270 NON-COVERED ITEM OR SERVICE: HCPCS | Performed by: HOSPITALIST

## 2020-06-10 PROCEDURE — 85025 COMPLETE CBC W/AUTO DIFF WBC: CPT

## 2020-06-10 PROCEDURE — 700102 HCHG RX REV CODE 250 W/ 637 OVERRIDE(OP): Performed by: DENTIST

## 2020-06-10 PROCEDURE — 700102 HCHG RX REV CODE 250 W/ 637 OVERRIDE(OP): Performed by: HOSPITALIST

## 2020-06-10 PROCEDURE — 700111 HCHG RX REV CODE 636 W/ 250 OVERRIDE (IP): Performed by: HOSPITALIST

## 2020-06-10 PROCEDURE — 99239 HOSP IP/OBS DSCHRG MGMT >30: CPT | Performed by: FAMILY MEDICINE

## 2020-06-10 PROCEDURE — 36415 COLL VENOUS BLD VENIPUNCTURE: CPT

## 2020-06-10 PROCEDURE — 700105 HCHG RX REV CODE 258: Performed by: HOSPITALIST

## 2020-06-10 RX ORDER — AMOXICILLIN AND CLAVULANATE POTASSIUM 875; 125 MG/1; MG/1
1 TABLET, FILM COATED ORAL 2 TIMES DAILY
Qty: 14 TAB | Refills: 0 | Status: SHIPPED | OUTPATIENT
Start: 2020-06-10 | End: 2020-06-10 | Stop reason: SDUPTHER

## 2020-06-10 RX ORDER — AMOXICILLIN AND CLAVULANATE POTASSIUM 875; 125 MG/1; MG/1
1 TABLET, FILM COATED ORAL 2 TIMES DAILY
Qty: 14 TAB | Refills: 0 | Status: SHIPPED | OUTPATIENT
Start: 2020-06-10

## 2020-06-10 RX ADMIN — SODIUM CHLORIDE 3 G: 900 INJECTION INTRAVENOUS at 05:35

## 2020-06-10 RX ADMIN — SODIUM CHLORIDE 3 G: 900 INJECTION INTRAVENOUS at 01:02

## 2020-06-10 RX ADMIN — NICOTINE 14 MG: 14 PATCH TRANSDERMAL at 05:34

## 2020-06-10 RX ADMIN — CHLORHEXIDINE GLUCONATE 0.12% ORAL RINSE 15 ML: 1.2 LIQUID ORAL at 05:35

## 2020-06-10 NOTE — DISCHARGE INSTRUCTIONS
Discharge Instructions    Discharged to home by car with relative. Discharged via wheelchair, hospital escort: Yes.  Special equipment needed: Not Applicable    Be sure to schedule a follow-up appointment with your primary care doctor or any specialists as instructed.     Discharge Plan:   Diet Plan: Discussed  Activity Level: Discussed  Confirmed Follow up Appointment: Patient to Call and Schedule Appointment  Confirmed Symptoms Management: Discussed  Medication Reconciliation Updated: Yes    I understand that a diet low in cholesterol, fat, and sodium is recommended for good health. Unless I have been given specific instructions below for another diet, I accept this instruction as my diet prescription.   Other diet: Regular, soft food     Special Instructions: None    · Is patient discharged on Warfarin / Coumadin?   No     Depression / Suicide Risk    As you are discharged from this [facility] facility, it is important to learn how to keep safe from harming yourself.    Recognize the warning signs:  · Abrupt changes in personality, positive or negative- including increase in energy   · Giving away possessions  · Change in eating patterns- significant weight changes-  positive or negative  · Change in sleeping patterns- unable to sleep or sleeping all the time   · Unwillingness or inability to communicate  · Depression  · Unusual sadness, discouragement and loneliness  · Talk of wanting to die  · Neglect of personal appearance   · Rebelliousness- reckless behavior  · Withdrawal from people/activities they love  · Confusion- inability to concentrate     If you or a loved one observes any of these behaviors or has concerns about self-harm, here's what you can do:  · Talk about it- your feelings and reasons for harming yourself  · Remove any means that you might use to hurt yourself (examples: pills, rope, extension cords, firearm)  · Get professional help from the community (Mental Health, Substance Abuse,  psychological counseling)  · Do not be alone:Call your Safe Contact- someone whom you trust who will be there for you.  · Call your local CRISIS HOTLINE 730-9224 or 200-813-8883  · Call your local Children's Mobile Crisis Response Team [place] (136) 051-4116 or www.[employer]  · Call the toll free National Suicide Prevention Hotlines   · National Suicide Prevention Lifeline 906-385-ZRQT (3562)  · National Hope Line Network 800-SUICIDE (936-6789)

## 2020-06-10 NOTE — PROGRESS NOTES
Pt a/o x 4. Denies pain. Up to bathroom with steady gait. Mouth appears slightly swollen. IV antibiotics infusing. Call light is within reach and rounding taking place every hour.

## 2020-06-10 NOTE — PROGRESS NOTES
Discharge paperwork gone over with pt. Pt verbalized understanding. Pt aware to follow up with Dr. Chapman. Work note sent home with pt. PIV dc'd with tip intact. Pt chose to ambulate to mother's car without escort.

## 2020-06-10 NOTE — PROGRESS NOTES
2 RN Skin Check    2 RN skin check complete.   Devices in place: n/a.  Skin assessed under devices: N\A.  Confirmed pressure ulcers found on: n/a.  New potential pressure ulcers noted on n/a. Wound consult placed N/A.  The following interventions in place Pillows.    Noted redness and swelling to R neck, otherwise skin intact.

## 2020-06-10 NOTE — PROGRESS NOTES
Pt's mom, Parth Soriano, came back to hospital to retrieve pt's rubber white ring. Ring given to pt's mother.

## 2020-06-10 NOTE — PROGRESS NOTES
Received report and assumed care of patient at 1900. AOx4; Up self - not a fall risk; Soft diet; Left PIV flushes, no blood return noted, fluids infusing. No c/o pain. POC discussed with patient, all questions and concerns addressed; Bed locked and in lowest position; Call light and personal belongings within reach; Hourly rounding in place.

## 2020-06-29 ENCOUNTER — HOSPITAL ENCOUNTER (EMERGENCY)
Facility: MEDICAL CENTER | Age: 30
End: 2020-06-30
Attending: EMERGENCY MEDICINE
Payer: OTHER GOVERNMENT

## 2020-06-29 DIAGNOSIS — S61.411A LACERATION OF RIGHT HAND WITHOUT FOREIGN BODY, INITIAL ENCOUNTER: ICD-10-CM

## 2020-06-29 PROCEDURE — 700101 HCHG RX REV CODE 250: Performed by: EMERGENCY MEDICINE

## 2020-06-29 PROCEDURE — 304999 HCHG REPAIR-SIMPLE/INTERMED LEVEL 1

## 2020-06-29 PROCEDURE — 303485 HCHG DRESSING MEDIUM

## 2020-06-29 PROCEDURE — 90715 TDAP VACCINE 7 YRS/> IM: CPT | Performed by: EMERGENCY MEDICINE

## 2020-06-29 PROCEDURE — 303747 HCHG EXTRA SUTURE

## 2020-06-29 PROCEDURE — 90471 IMMUNIZATION ADMIN: CPT

## 2020-06-29 PROCEDURE — 700111 HCHG RX REV CODE 636 W/ 250 OVERRIDE (IP): Performed by: EMERGENCY MEDICINE

## 2020-06-29 PROCEDURE — 99282 EMERGENCY DEPT VISIT SF MDM: CPT

## 2020-06-29 PROCEDURE — 304217 HCHG IRRIGATION SYSTEM

## 2020-06-29 RX ORDER — LIDOCAINE HYDROCHLORIDE AND EPINEPHRINE BITARTRATE 20; .01 MG/ML; MG/ML
INJECTION, SOLUTION SUBCUTANEOUS
Status: DISCONTINUED
Start: 2020-06-29 | End: 2020-06-30 | Stop reason: HOSPADM

## 2020-06-29 RX ORDER — LIDOCAINE HYDROCHLORIDE AND EPINEPHRINE BITARTRATE 20; .01 MG/ML; MG/ML
10 INJECTION, SOLUTION SUBCUTANEOUS ONCE
Status: COMPLETED | OUTPATIENT
Start: 2020-06-30 | End: 2020-06-29

## 2020-06-29 RX ORDER — LIDOCAINE HYDROCHLORIDE AND EPINEPHRINE 10; 10 MG/ML; UG/ML
20 INJECTION, SOLUTION INFILTRATION; PERINEURAL ONCE
Status: DISCONTINUED | OUTPATIENT
Start: 2020-06-29 | End: 2020-06-29

## 2020-06-29 RX ORDER — EPINEPHRINE 1 MG/ML(1)
AMPUL (ML) INJECTION
Status: COMPLETED
Start: 2020-06-29 | End: 2020-06-29

## 2020-06-29 RX ADMIN — LIDOCAINE HYDROCHLORIDE AND EPINEPHRINE 10 ML: 20; 10 INJECTION, SOLUTION INFILTRATION; PERINEURAL at 23:36

## 2020-06-29 RX ADMIN — CLOSTRIDIUM TETANI TOXOID ANTIGEN (FORMALDEHYDE INACTIVATED), CORYNEBACTERIUM DIPHTHERIAE TOXOID ANTIGEN (FORMALDEHYDE INACTIVATED), BORDETELLA PERTUSSIS TOXOID ANTIGEN (GLUTARALDEHYDE INACTIVATED), BORDETELLA PERTUSSIS FILAMENTOUS HEMAGGLUTININ ANTIGEN (FORMALDEHYDE INACTIVATED), BORDETELLA PERTUSSIS PERTACTIN ANTIGEN, AND BORDETELLA PERTUSSIS FIMBRIAE 2/3 ANTIGEN 0.5 ML: 5; 2; 2.5; 5; 3; 5 INJECTION, SUSPENSION INTRAMUSCULAR at 23:45

## 2020-06-29 ASSESSMENT — FIBROSIS 4 INDEX: FIB4 SCORE: 0.55

## 2020-06-30 VITALS
RESPIRATION RATE: 17 BRPM | BODY MASS INDEX: 18.65 KG/M2 | TEMPERATURE: 98.1 F | OXYGEN SATURATION: 100 % | WEIGHT: 150 LBS | SYSTOLIC BLOOD PRESSURE: 116 MMHG | HEART RATE: 81 BPM | DIASTOLIC BLOOD PRESSURE: 83 MMHG | HEIGHT: 75 IN

## 2020-06-30 NOTE — ED TRIAGE NOTES
"Chief Complaint   Patient presents with   • Laceration     right wrist     Pt reports cutting wrist with knife when it slipped off the food he was cutting. Approximately 2-3 CM wrist laceration observed. Bleeding controlled on arrival and pt reports up to date on tetanus. Pt educated on triage process and placed back in lobby, pt encourage to alert staff with changes in condition.      /95   Pulse 77   Temp 36.3 °C (97.3 °F) (Oral)   Resp 16   Ht 1.905 m (6' 3\")   Wt 68 kg (150 lb)   SpO2 100%   BMI 18.75 kg/m²     "

## 2020-06-30 NOTE — DISCHARGE INSTRUCTIONS
Wash with soap and water, apply antibiotic ointment and keep covered. Return if you have increasing pain, redness, fever or pus.

## 2020-06-30 NOTE — ED PROVIDER NOTES
"ED Provider Note    Scribed for Alan Grullon M.D. by Agapito Ansari. 6/29/2020, 11:17 PM.    Primary care provider: Pcp Pt States None  Means of arrival: Walk-in  History obtained from: Patient  History limited by: None    CHIEF COMPLAINT  Chief Complaint   Patient presents with   • Laceration     right wrist       HPI  Aristides Kang is a 30 y.o. male who presents to the Emergency Department for an acute, mild laceration to right wrist onset 1 hour ago. Patient states that he was cutting some vegetables with a knife when he accidentally lacerated his wrist. He was able to control the bleeding PTA. Denies any numbness/tingling or weakness. Patient denies any attempt to hurt himself.  Patient is not up-to-date on his tetanus shot    REVIEW OF SYSTEMS  Pertinent positives include laceration. Pertinent negatives include numbness/tingling or weakness. All other systems negative.    PAST MEDICAL HISTORY       SURGICAL HISTORY   has a past surgical history that includes submandible abscess incision and drainage (6/8/2020).    SOCIAL HISTORY  Social History     Tobacco Use   • Smoking status: Current Every Day Smoker     Packs/day: 0.50     Years: 4.00     Pack years: 2.00     Types: Cigarettes   • Smokeless tobacco: Never Used   Substance Use Topics   • Alcohol use: Yes     Comment: occ   • Drug use: No      Social History     Substance and Sexual Activity   Drug Use No       FAMILY HISTORY  No family history on file.    CURRENT MEDICATIONS  Current Outpatient Medications:   •  amoxicillin-clavulanate (AUGMENTIN) 875-125 MG Tab, Take 1 Tab by mouth 2 times a day., Disp: 14 Tab, Rfl: 0    ALLERGIES  No Known Allergies    PHYSICAL EXAM  VITAL SIGNS: /95   Pulse 77   Temp 36.3 °C (97.3 °F) (Oral)   Resp 16   Ht 1.905 m (6' 3\")   Wt 68 kg (150 lb)   SpO2 100%   BMI 18.75 kg/m²     Constitutional: Well developed, Well nourished, mild distress.   HENT: Normocephalic, Atraumatic, mask in place. "   Eyes: Conjunctiva normal, No discharge.   Musculoskeletal: No major deformities noted, No tenderness.  Patient able to fully flex and extend all his fingers against resistance.  Skin: 2 cm laceration over the right hypothenar eminence near the crease of the wrist to the medial aspect. No obvious tendon involvement. Able to full flex and extend all fingers. Warm, Dry, No erythema, No rash.   Neurologic: Alert & oriented x 3, Normal motor function,  No focal deficits noted.   Psychiatric: Affect normal, Judgment normal, Mood normal.     Laceration Repair Procedure Note    Indication: Laceration    Procedure: The patient was placed in the appropriate position and anesthesia around the laceration was obtained by infiltration using 2% Lidocaine with epinephrine. The area was then irrigated with normal saline and prepped with betadine . The laceration was closed with 4-0 Ethilon using interrupted sutures. There were no additional lacerations requiring repair. The wound area was then dressed with a sterile dressing.      Total repaired wound length: 2 cm.     Other Items: None and Suture count: 6    The patient tolerated the procedure well.    Complications: None      COURSE & MEDICAL DECISION MAKING  Pertinent Labs & Imaging studies reviewed. (See chart for details)    11:17 PM - Patient seen and examined at bedside. Patient will be treated with Adacel 0.5 mL. His laceration was repaired at this time as outlined above. Return precautions were discussed with the patient, and they were cleared for discharge at this time. Patient was understanding and agreeable to discharge.    Medical Decision Making: Patient presents with a laceration to his right wrist.  This is approximately 2 cm.  There is no foreign body or tendon involvement.  It was irrigated and closed as above.    The patient will return for new or worsening symptoms and is stable at the time of discharge.    The patient is referred to a primary physician for  blood pressure management, diabetic screening, and for all other preventative health concerns.    DISPOSITION:  Patient will be discharged home in stable condition.    FOLLOW UP:  No follow-up provider specified.    OUTPATIENT MEDICATIONS:  New Prescriptions    No medications on file        FINAL IMPRESSION  1. Laceration of right hand without foreign body, initial encounter       Laceration Repair Procedure     Agapito SNOWDEN (Scribe), am scribing for, and in the presence of, Alan Grullon M.D.    Electronically signed by: Agapito Ansari (Sandyibsu), 6/29/2020    Alan SNOWDEN M.D. personally performed the services described in this documentation, as scribed by Agapito Ansari in my presence, and it is both accurate and complete.    C.    The note accurately reflects work and decisions made by me.  Alan Grullon M.D.  6/30/2020  2:39 AM

## 2020-06-30 NOTE — ED NOTES
Pt given all discharge and follow up instructions. Pt is alert and oriented and verbalized understanding. VSS. Pt ambulates with steady gait.

## 2020-07-09 ENCOUNTER — OFFICE VISIT (OUTPATIENT)
Dept: URGENT CARE | Facility: CLINIC | Age: 30
End: 2020-07-09

## 2020-07-09 VITALS
HEIGHT: 75 IN | OXYGEN SATURATION: 97 % | WEIGHT: 142.6 LBS | DIASTOLIC BLOOD PRESSURE: 88 MMHG | RESPIRATION RATE: 14 BRPM | BODY MASS INDEX: 17.73 KG/M2 | SYSTOLIC BLOOD PRESSURE: 130 MMHG | HEART RATE: 91 BPM | TEMPERATURE: 98.5 F

## 2020-07-09 DIAGNOSIS — Z48.02 VISIT FOR SUTURE REMOVAL: ICD-10-CM

## 2020-07-09 PROCEDURE — 99212 OFFICE O/P EST SF 10 MIN: CPT | Performed by: PHYSICIAN ASSISTANT

## 2020-07-09 ASSESSMENT — FIBROSIS 4 INDEX: FIB4 SCORE: 0.55

## 2021-07-17 ENCOUNTER — APPOINTMENT (OUTPATIENT)
Dept: RADIOLOGY | Facility: MEDICAL CENTER | Age: 31
End: 2021-07-17
Attending: EMERGENCY MEDICINE
Payer: COMMERCIAL

## 2021-07-17 ENCOUNTER — HOSPITAL ENCOUNTER (EMERGENCY)
Facility: MEDICAL CENTER | Age: 31
End: 2021-07-17
Attending: EMERGENCY MEDICINE
Payer: COMMERCIAL

## 2021-07-17 VITALS
TEMPERATURE: 98.7 F | RESPIRATION RATE: 19 BRPM | DIASTOLIC BLOOD PRESSURE: 86 MMHG | SYSTOLIC BLOOD PRESSURE: 138 MMHG | HEART RATE: 80 BPM | WEIGHT: 135 LBS | BODY MASS INDEX: 16.78 KG/M2 | OXYGEN SATURATION: 98 % | HEIGHT: 75 IN

## 2021-07-17 DIAGNOSIS — F41.0 PANIC ATTACK: ICD-10-CM

## 2021-07-17 DIAGNOSIS — F41.9 ANXIETY: ICD-10-CM

## 2021-07-17 DIAGNOSIS — R07.9 CHEST PAIN, UNSPECIFIED TYPE: ICD-10-CM

## 2021-07-17 LAB
ALBUMIN SERPL BCP-MCNC: 5 G/DL (ref 3.2–4.9)
ALBUMIN/GLOB SERPL: 1.7 G/DL
ALP SERPL-CCNC: 65 U/L (ref 30–99)
ALT SERPL-CCNC: 29 U/L (ref 2–50)
ANION GAP SERPL CALC-SCNC: 15 MMOL/L (ref 7–16)
AST SERPL-CCNC: 32 U/L (ref 12–45)
BASOPHILS # BLD AUTO: 0.1 % (ref 0–1.8)
BASOPHILS # BLD: 0.01 K/UL (ref 0–0.12)
BILIRUB SERPL-MCNC: 1 MG/DL (ref 0.1–1.5)
BUN SERPL-MCNC: 11 MG/DL (ref 8–22)
CALCIUM SERPL-MCNC: 9.7 MG/DL (ref 8.5–10.5)
CHLORIDE SERPL-SCNC: 99 MMOL/L (ref 96–112)
CO2 SERPL-SCNC: 22 MMOL/L (ref 20–33)
CREAT SERPL-MCNC: 0.72 MG/DL (ref 0.5–1.4)
EOSINOPHIL # BLD AUTO: 0.29 K/UL (ref 0–0.51)
EOSINOPHIL NFR BLD: 4 % (ref 0–6.9)
ERYTHROCYTE [DISTWIDTH] IN BLOOD BY AUTOMATED COUNT: 44.3 FL (ref 35.9–50)
GLOBULIN SER CALC-MCNC: 3 G/DL (ref 1.9–3.5)
GLUCOSE SERPL-MCNC: 130 MG/DL (ref 65–99)
HCT VFR BLD AUTO: 46.6 % (ref 42–52)
HGB BLD-MCNC: 16.2 G/DL (ref 14–18)
IMM GRANULOCYTES # BLD AUTO: 0.01 K/UL (ref 0–0.11)
IMM GRANULOCYTES NFR BLD AUTO: 0.1 % (ref 0–0.9)
LYMPHOCYTES # BLD AUTO: 3.09 K/UL (ref 1–4.8)
LYMPHOCYTES NFR BLD: 42.4 % (ref 22–41)
MCH RBC QN AUTO: 32.3 PG (ref 27–33)
MCHC RBC AUTO-ENTMCNC: 34.8 G/DL (ref 33.7–35.3)
MCV RBC AUTO: 92.8 FL (ref 81.4–97.8)
MONOCYTES # BLD AUTO: 0.78 K/UL (ref 0–0.85)
MONOCYTES NFR BLD AUTO: 10.7 % (ref 0–13.4)
NEUTROPHILS # BLD AUTO: 3.11 K/UL (ref 1.82–7.42)
NEUTROPHILS NFR BLD: 42.7 % (ref 44–72)
NRBC # BLD AUTO: 0 K/UL
NRBC BLD-RTO: 0 /100 WBC
PLATELET # BLD AUTO: 257 K/UL (ref 164–446)
PMV BLD AUTO: 9.2 FL (ref 9–12.9)
POTASSIUM SERPL-SCNC: 3.8 MMOL/L (ref 3.6–5.5)
PROT SERPL-MCNC: 8 G/DL (ref 6–8.2)
RBC # BLD AUTO: 5.02 M/UL (ref 4.7–6.1)
SODIUM SERPL-SCNC: 136 MMOL/L (ref 135–145)
TROPONIN T SERPL-MCNC: <6 NG/L (ref 6–19)
WBC # BLD AUTO: 7.3 K/UL (ref 4.8–10.8)

## 2021-07-17 PROCEDURE — 99284 EMERGENCY DEPT VISIT MOD MDM: CPT

## 2021-07-17 PROCEDURE — 84484 ASSAY OF TROPONIN QUANT: CPT

## 2021-07-17 PROCEDURE — 85025 COMPLETE CBC W/AUTO DIFF WBC: CPT

## 2021-07-17 PROCEDURE — 71045 X-RAY EXAM CHEST 1 VIEW: CPT

## 2021-07-17 PROCEDURE — 36415 COLL VENOUS BLD VENIPUNCTURE: CPT

## 2021-07-17 PROCEDURE — 93005 ELECTROCARDIOGRAM TRACING: CPT

## 2021-07-17 PROCEDURE — 80053 COMPREHEN METABOLIC PANEL: CPT

## 2021-07-17 ASSESSMENT — FIBROSIS 4 INDEX: FIB4 SCORE: 0.57

## 2021-07-18 ENCOUNTER — HOSPITAL ENCOUNTER (EMERGENCY)
Facility: MEDICAL CENTER | Age: 31
End: 2021-07-18
Attending: EMERGENCY MEDICINE
Payer: COMMERCIAL

## 2021-07-18 ENCOUNTER — APPOINTMENT (OUTPATIENT)
Dept: RADIOLOGY | Facility: MEDICAL CENTER | Age: 31
End: 2021-07-18
Attending: EMERGENCY MEDICINE
Payer: COMMERCIAL

## 2021-07-18 VITALS
HEART RATE: 77 BPM | HEIGHT: 75 IN | OXYGEN SATURATION: 98 % | SYSTOLIC BLOOD PRESSURE: 116 MMHG | WEIGHT: 129.85 LBS | DIASTOLIC BLOOD PRESSURE: 78 MMHG | TEMPERATURE: 98.1 F | BODY MASS INDEX: 16.15 KG/M2 | RESPIRATION RATE: 18 BRPM

## 2021-07-18 DIAGNOSIS — F41.9 ANXIETY: ICD-10-CM

## 2021-07-18 DIAGNOSIS — R07.9 CHEST PAIN, UNSPECIFIED TYPE: ICD-10-CM

## 2021-07-18 LAB
ALBUMIN SERPL BCP-MCNC: 5.3 G/DL (ref 3.2–4.9)
ALBUMIN/GLOB SERPL: 1.9 G/DL
ALP SERPL-CCNC: 65 U/L (ref 30–99)
ALT SERPL-CCNC: 27 U/L (ref 2–50)
ANION GAP SERPL CALC-SCNC: 12 MMOL/L (ref 7–16)
AST SERPL-CCNC: 22 U/L (ref 12–45)
BASOPHILS # BLD AUTO: 0.2 % (ref 0–1.8)
BASOPHILS # BLD: 0.01 K/UL (ref 0–0.12)
BILIRUB SERPL-MCNC: 1.2 MG/DL (ref 0.1–1.5)
BUN SERPL-MCNC: 10 MG/DL (ref 8–22)
CALCIUM SERPL-MCNC: 10 MG/DL (ref 8.5–10.5)
CHLORIDE SERPL-SCNC: 99 MMOL/L (ref 96–112)
CO2 SERPL-SCNC: 24 MMOL/L (ref 20–33)
CREAT SERPL-MCNC: 0.7 MG/DL (ref 0.5–1.4)
EKG IMPRESSION: NORMAL
EKG IMPRESSION: NORMAL
EOSINOPHIL # BLD AUTO: 0.1 K/UL (ref 0–0.51)
EOSINOPHIL NFR BLD: 1.6 % (ref 0–6.9)
ERYTHROCYTE [DISTWIDTH] IN BLOOD BY AUTOMATED COUNT: 44.9 FL (ref 35.9–50)
GLOBULIN SER CALC-MCNC: 2.8 G/DL (ref 1.9–3.5)
GLUCOSE SERPL-MCNC: 96 MG/DL (ref 65–99)
HCT VFR BLD AUTO: 48.9 % (ref 42–52)
HGB BLD-MCNC: 17 G/DL (ref 14–18)
IMM GRANULOCYTES # BLD AUTO: 0.02 K/UL (ref 0–0.11)
IMM GRANULOCYTES NFR BLD AUTO: 0.3 % (ref 0–0.9)
LYMPHOCYTES # BLD AUTO: 1.62 K/UL (ref 1–4.8)
LYMPHOCYTES NFR BLD: 26.6 % (ref 22–41)
MCH RBC QN AUTO: 32.6 PG (ref 27–33)
MCHC RBC AUTO-ENTMCNC: 34.8 G/DL (ref 33.7–35.3)
MCV RBC AUTO: 93.9 FL (ref 81.4–97.8)
MONOCYTES # BLD AUTO: 0.53 K/UL (ref 0–0.85)
MONOCYTES NFR BLD AUTO: 8.7 % (ref 0–13.4)
NEUTROPHILS # BLD AUTO: 3.82 K/UL (ref 1.82–7.42)
NEUTROPHILS NFR BLD: 62.6 % (ref 44–72)
NRBC # BLD AUTO: 0 K/UL
NRBC BLD-RTO: 0 /100 WBC
PLATELET # BLD AUTO: 254 K/UL (ref 164–446)
PMV BLD AUTO: 9.2 FL (ref 9–12.9)
POTASSIUM SERPL-SCNC: 4.6 MMOL/L (ref 3.6–5.5)
PROT SERPL-MCNC: 8.1 G/DL (ref 6–8.2)
RBC # BLD AUTO: 5.21 M/UL (ref 4.7–6.1)
SARS-COV-2 RNA RESP QL NAA+PROBE: NOTDETECTED
SODIUM SERPL-SCNC: 135 MMOL/L (ref 135–145)
SPECIMEN SOURCE: NORMAL
TROPONIN T SERPL-MCNC: 9 NG/L (ref 6–19)
TROPONIN T SERPL-MCNC: 9 NG/L (ref 6–19)
WBC # BLD AUTO: 6.1 K/UL (ref 4.8–10.8)

## 2021-07-18 PROCEDURE — A9270 NON-COVERED ITEM OR SERVICE: HCPCS | Performed by: EMERGENCY MEDICINE

## 2021-07-18 PROCEDURE — 99284 EMERGENCY DEPT VISIT MOD MDM: CPT

## 2021-07-18 PROCEDURE — 93005 ELECTROCARDIOGRAM TRACING: CPT | Performed by: EMERGENCY MEDICINE

## 2021-07-18 PROCEDURE — U0005 INFEC AGEN DETEC AMPLI PROBE: HCPCS

## 2021-07-18 PROCEDURE — 93005 ELECTROCARDIOGRAM TRACING: CPT

## 2021-07-18 PROCEDURE — 84484 ASSAY OF TROPONIN QUANT: CPT

## 2021-07-18 PROCEDURE — 71045 X-RAY EXAM CHEST 1 VIEW: CPT

## 2021-07-18 PROCEDURE — 85025 COMPLETE CBC W/AUTO DIFF WBC: CPT

## 2021-07-18 PROCEDURE — 700102 HCHG RX REV CODE 250 W/ 637 OVERRIDE(OP): Performed by: EMERGENCY MEDICINE

## 2021-07-18 PROCEDURE — 36415 COLL VENOUS BLD VENIPUNCTURE: CPT

## 2021-07-18 PROCEDURE — 80053 COMPREHEN METABOLIC PANEL: CPT

## 2021-07-18 PROCEDURE — U0003 INFECTIOUS AGENT DETECTION BY NUCLEIC ACID (DNA OR RNA); SEVERE ACUTE RESPIRATORY SYNDROME CORONAVIRUS 2 (SARS-COV-2) (CORONAVIRUS DISEASE [COVID-19]), AMPLIFIED PROBE TECHNIQUE, MAKING USE OF HIGH THROUGHPUT TECHNOLOGIES AS DESCRIBED BY CMS-2020-01-R: HCPCS

## 2021-07-18 RX ORDER — OMEPRAZOLE 20 MG/1
20 CAPSULE, DELAYED RELEASE ORAL DAILY
Qty: 30 CAPSULE | Refills: 0 | Status: SHIPPED | OUTPATIENT
Start: 2021-07-18

## 2021-07-18 RX ADMIN — LIDOCAINE HYDROCHLORIDE 30 ML: 20 SOLUTION OROPHARYNGEAL at 09:41

## 2021-07-18 ASSESSMENT — FIBROSIS 4 INDEX: FIB4 SCORE: 0.72

## 2021-07-18 ASSESSMENT — PAIN DESCRIPTION - PAIN TYPE
TYPE: ACUTE PAIN
TYPE: ACUTE PAIN

## 2021-07-18 NOTE — ED NOTES
Placed on cardiac monitor/pulse ox/bp. Call light within reach. Instructed to call staff for any assistance.  Reports intermittent chest pain describes as 5/10 also c/o dizziness and pressure on his head. Aaox4. Denies trauma/fall.

## 2021-07-18 NOTE — ED PROVIDER NOTES
"ER Provider Note     Scribed for Mio Hernandez M.D. by Ely Alves. 7/17/2021, 9:14 PM.    Primary Care Provider: Pcp Pt States None  Means of Arrival: EMS   History obtained from: Patient  History limited by: None     CHIEF COMPLAINT  Chief Complaint   Patient presents with    Muscle Spasms     Left pectoral tightness    Numbness     radiating from spine down both legs    Panic Attack     onset 1800    Pressure Behind the Eyes    Anxiety     panic       HPI  Aristides Kang is a 31 y.o. male who presents to the Emergency Department via EMS for evaluation following a panic attack onset around 6 pm. He started having chest tightness and tingling radiating down his spine. He is additionally having pressure behind his eyes. He drank last night and smoked marijuana around 2 this afternoon.     REVIEW OF SYSTEMS  See HPI for further details. All other systems are negative.     PAST MEDICAL HISTORY       SURGICAL HISTORY   has a past surgical history that includes submandible abscess incision and drainage (6/8/2020).    SOCIAL HISTORY  Social History     Tobacco Use    Smoking status: Current Every Day Smoker     Packs/day: 0.50     Years: 4.00     Pack years: 2.00     Types: Cigarettes    Smokeless tobacco: Never Used   Vaping Use    Vaping Use: Former   Substance Use Topics    Alcohol use: Yes     Comment: occ    Drug use: No      Social History     Substance and Sexual Activity   Drug Use No       FAMILY HISTORY  History reviewed. No pertinent family history.    CURRENT MEDICATIONS  Home Medications       Reviewed by Huang Sharp R.N. (Registered Nurse) on 07/17/21 at 2110  Med List Status: Partial     Medication Last Dose Status   amoxicillin-clavulanate (AUGMENTIN) 875-125 MG Tab  Active                    ALLERGIES  No Known Allergies    PHYSICAL EXAM  VITAL SIGNS: BP (!) 167/104   Pulse 77   Temp 36.8 °C (98.2 °F) (Temporal)   Resp 18   Ht 1.905 m (6' 3\")   Wt 61.2 kg (135 lb)   SpO2 100%   " BMI 16.87 kg/m²      Constitutional: Alert in no apparent distress.  HENT: No signs of trauma, Bilateral external ears normal, Nose normal.   Eyes: Pupils are equal and reactive, Conjunctiva normal, Non-icteric.   Neck: Normal range of motion, No tenderness, Supple, No stridor.   Lymphatic: No lymphadenopathy noted.   Cardiovascular: Regular rate and rhythm, no palpable thrill  Thorax & Lungs: No respiratory distress,  No chest tenderness.   Abdomen: Bowel sounds normal, Soft, No tenderness, No masses, No pulsatile masses. No peritoneal signs.  Skin: Warm, Dry, No erythema, No rash.   Back: No bony tenderness, No CVA tenderness.   Extremities: Intact distal pulses, No edema, No tenderness, No cyanosis.  Musculoskeletal: Good range of motion in all major joints. No tenderness to palpation or major deformities noted.   Neurologic: Alert , Normal motor function, Normal sensory function, No focal deficits noted.   Psychiatric: Affect normal, Judgment normal, Mood normal.     DIAGNOSTIC STUDIES / PROCEDURES    EKG Interpretation:  Interpreted by me    12 Lead EKG interpreted by me to show:  Normal sinus rhythm  Rate 81  Axis: Normal  Intervals: Normal  Normal T waves, no inversion  Normal ST segments, no elevation or depression  My impression of this EKG: Does not indicate ischemia or arrhythmia at this time.     LABS  Labs Reviewed   CBC WITH DIFFERENTIAL - Abnormal; Notable for the following components:       Result Value    Neutrophils-Polys 42.70 (*)     Lymphocytes 42.40 (*)     All other components within normal limits   COMP METABOLIC PANEL - Abnormal; Notable for the following components:    Glucose 130 (*)     Albumin 5.0 (*)     All other components within normal limits   TROPONIN   ESTIMATED GFR     All labs reviewed by me.    RADIOLOGY  DX-CHEST-PORTABLE (1 VIEW)   Final Result         1. No acute cardiopulmonary abnormalities are identified.         The radiologist's interpretation of all radiological  studies have been reviewed by me.    COURSE & MEDICAL DECISION MAKING  Pertinent Labs & Imaging studies reviewed. (See chart for details)    This is a 31 y.o. male that presents with symptoms of panic as well as chest pain.  Will evaluate the patient for myocardial ischemia.  We will discuss other causes for his symptoms.  We will evaluate him for pneumonia pneumothorax.  We will reassess after this..     9:14 PM - Patient seen and examined at bedside. Ordered EKG, DX-chest, CBC with differential, CMP, and troponin.     10:50 PM - Patient was reevaluated at bedside. Discussed lab and radiology  results with the patient. Discussed discharge instructions and return precautions with the patient and they were cleared for discharge. Patient was given the opportunity to ask any further questions. He is comfortable with discharge at this time.      The patient will return for new or worsening symptoms and is stable at the time of discharge.    Patient was found to have a negative chest x-ray the troponin is negative.  There is no significant electrolyte derangements.  The patient is a low heart score.  Will discharge patient home with strict return precautions and follow-up.    The patient is referred to a primary physician for blood pressure management, diabetic screening, and for all other preventative health concerns.    DISPOSITION:  Patient will be discharged home in stable condition.    FOLLOW UP:  [address] [address] [phone]  Go in 2 days      OUTPATIENT MEDICATIONS:  Discharge Medication List as of 7/17/2021 10:48 PM          FINAL IMPRESSION  1. Anxiety    2. Panic attack    3. Chest pain, unspecified type          I, Ely Alves (Scribe), am scribing for, and in the presence of, Mio Hernandez M.D..    Electronically signed by: Ely Foote), 7/17/2021    IMio M.D. personally performed the services described in this documentation,  as scribed by Ely Alves in my presence, and it is both accurate and complete.     The note accurately reflects work and decisions made by me.  Mio Hernandez M.D.  7/18/2021  1:05 AM

## 2021-07-18 NOTE — ED PROVIDER NOTES
ED Provider Note    CHIEF COMPLAINT  Chief Complaint   Patient presents with   • Anxiety     Pt was seen in this ER yesterday for chest pain believing he was having a heart attack, pt was dc'd with dx of panic attack. Pt returns today for same sx stating his dc paperwork told him to return for sx of chest pain or diaphoresis. NAD at triage, VSS on RA.    • Chest Pain     X 2 days, tightness, constant, 'worse when I swallow like I'm choking', L side of chest non-radiating, denies cardiac hx, negative cardiac workup yesterday in this ER.        HPI  Aristides Kang is a 31 y.o. male who presents to the emergency department with recurrent chest pain.  Patient started having symptoms a few days ago.  He describes having a sore throat and pain in his chest.  He has increased symptoms when he swallows.  He has had a mild cough.  No runny nose.  He has had occasional upper abdominal pain and epigastric pain.  He has felt nauseous he has not vomited.  He has not had any diarrhea.  No dysuria hematuria frequency.  He was seen in the emergency department yesterday for these complaints.  Had laboratory data and EKG that were reassuring.  He read his instructions which said to come back to the ER if you have any recurrent pain.  He was able to go to sleep last night he woke up this morning with symptoms and therefore returns.  He feels anxious because of this.    REVIEW OF SYSTEMS  As per HPI, otherwise a 10 point review of systems is negative    PAST MEDICAL HISTORY  Negative    Denies diabetes, hypertension, hyperlipidemia.  No early coronary disease.  No travel immobilization surgeries.  No leg swelling or leg pain.  No hormones.  No fevers, abdominal pain.    SOCIAL HISTORY  Social History     Tobacco Use   • Smoking status: Current Every Day Smoker     Packs/day: 0.50     Years: 4.00     Pack years: 2.00     Types: Cigarettes   • Smokeless tobacco: Never Used   Vaping Use   • Vaping Use: Former   Substance Use Topics  "  • Alcohol use: Yes     Comment: occ   • Drug use: No       SURGICAL HISTORY  Past Surgical History:   Procedure Laterality Date   • SUBMANDIBLE ABSCESS INCISION AND DRAINAGE  6/8/2020    Procedure: Right mandibular Incision and Drainage and Tooth extraction numbers 30,31,32;  Surgeon: Raoul Shearer D.D.S.;  Location: SURGERY [facility];  Service: Dental       CURRENT MEDICATIONS  Home Medications     Reviewed by Jaron Hernandez R.N. (Registered Nurse) on 07/18/21 at 0832  Med List Status: <None>   Medication Last Dose Status   amoxicillin-clavulanate (AUGMENTIN) 875-125 MG Tab  Active                ALLERGIES  No Known Allergies    PHYSICAL EXAM  VITAL SIGNS: /80   Pulse 72   Temp 36.7 °C (98.1 °F) (Temporal)   Resp 16   Ht 1.905 m (6' 3\")   Wt 58.9 kg (129 lb 13.6 oz)   SpO2 96%   BMI 16.23 kg/m²    Constitutional: Awake and alert  HENT: Normal inspection  Eyes: Normal inspection  Neck: Grossly normal range of motion.  Cardiovascular: Normal heart rate, Normal rhythm.  Symmetric peripheral pulses.   Thorax & Lungs: No respiratory distress, No wheezing, No rales, No rhonchi, No chest tenderness.   Abdomen: Bowel sounds normal, soft, non-distended, nontender, no mass  Skin: No obvious rash.  Back: No tenderness, No CVA tenderness.   Extremities: No clubbing, cyanosis, edema, no Homans or cords.  Neurologic: Grossly normal   Psychiatric: Normal for situation    RADIOLOGY/PROCEDURES  DX-CHEST-PORTABLE (1 VIEW)   Final Result      No acute cardiac or pulmonary abnormalities are identified.           Imaging is interpreted by radiologist    Labs:  Results for orders placed or performed during the hospital encounter of 07/18/21   CBC WITH DIFFERENTIAL   Result Value Ref Range    WBC 6.1 4.8 - 10.8 K/uL    RBC 5.21 4.70 - 6.10 M/uL    Hemoglobin 17.0 14.0 - 18.0 g/dL    Hematocrit 48.9 42.0 - 52.0 %    MCV 93.9 81.4 - 97.8 fL    MCH 32.6 27.0 - 33.0 pg    MCHC 34.8 33.7 - 35.3 g/dL    RDW 44.9 35.9 - " 50.0 fL    Platelet Count 254 164 - 446 K/uL    MPV 9.2 9.0 - 12.9 fL    Neutrophils-Polys 62.60 44.00 - 72.00 %    Lymphocytes 26.60 22.00 - 41.00 %    Monocytes 8.70 0.00 - 13.40 %    Eosinophils 1.60 0.00 - 6.90 %    Basophils 0.20 0.00 - 1.80 %    Immature Granulocytes 0.30 0.00 - 0.90 %    Nucleated RBC 0.00 /100 WBC    Neutrophils (Absolute) 3.82 1.82 - 7.42 K/uL    Lymphs (Absolute) 1.62 1.00 - 4.80 K/uL    Monos (Absolute) 0.53 0.00 - 0.85 K/uL    Eos (Absolute) 0.10 0.00 - 0.51 K/uL    Baso (Absolute) 0.01 0.00 - 0.12 K/uL    Immature Granulocytes (abs) 0.02 0.00 - 0.11 K/uL    NRBC (Absolute) 0.00 K/uL   COMP METABOLIC PANEL   Result Value Ref Range    Sodium 135 135 - 145 mmol/L    Potassium 4.6 3.6 - 5.5 mmol/L    Chloride 99 96 - 112 mmol/L    Co2 24 20 - 33 mmol/L    Anion Gap 12.0 7.0 - 16.0    Glucose 96 65 - 99 mg/dL    Bun 10 8 - 22 mg/dL    Creatinine 0.70 0.50 - 1.40 mg/dL    Calcium 10.0 8.5 - 10.5 mg/dL    AST(SGOT) 22 12 - 45 U/L    ALT(SGPT) 27 2 - 50 U/L    Alkaline Phosphatase 65 30 - 99 U/L    Total Bilirubin 1.2 0.1 - 1.5 mg/dL    Albumin 5.3 (H) 3.2 - 4.9 g/dL    Total Protein 8.1 6.0 - 8.2 g/dL    Globulin 2.8 1.9 - 3.5 g/dL    A-G Ratio 1.9 g/dL   TROPONIN   Result Value Ref Range    Troponin T 9 6 - 19 ng/L   SARS-CoV-2 PCR (24 hour In-House): Collect NP swab in [facility]    Specimen: Nasopharyngeal; Respirate   Result Value Ref Range    SARS-CoV-2 Source NP Swab    ESTIMATED GFR   Result Value Ref Range    GFR If African American >60 >60 mL/min/1.73 m 2    GFR If Non African American >60 >60 mL/min/1.73 m 2   TROPONIN   Result Value Ref Range    Troponin T 9 6 - 19 ng/L   EKG   Result Value Ref Range    Report       [facility]    Test Date:  2021  Pt Name:    AYLIN MONSALVE                Department: ER  MRN:        [id number]                      Room:  Gender:     Male                         Technician: [id number]  :        1990                    Requested By:ER TRIAGE PROTOCOL  Order #:    [id number]                    Reading MD: JOAN DELCID MD    Measurements  Intervals                                Axis  Rate:       76                           P:          76  FL:         164                          QRS:        82  QRSD:       94                           T:          68  QT:         388  QTc:        437    Interpretive Statements  SINUS RHYTHM  RSR prime.  J-point elevation without reciprocal change  Compared to ECG 07/17/2021 21:51:09  Electronically Signed On 7- 10:23:00 PDT by JOAN DELCID MD         Medications   hyoscyamine-maalox plus-lidocaine viscous (GI COCKTAIL) oral susp 30 mL (30 mL Oral Given 7/18/21 0941)     Covid screen is pending    Low risk heart score    COURSE & MEDICAL DECISION MAKING  Patient presents to the ER with chest pain.  Just here yesterday with similar symptoms.  Had laboratory data including troponin that was normal.  He describes worsening symptoms when he swallows.  He has also had a bit of a cough.  Gastroesophageal reflux is considered likely.  Obtained an EKG that shows J-point elevation without ischemic changes.  I obtained serial troponins that were within normal limits.  Negative for PE by PERC criteria.  No clinical pole suggestion of aortic dissection or surgical abdominal pathology.  His laboratory data and chest x-ray was normal.  I will treat suspected gastroesophageal reflux with Prilosec.  I suspect anxiety is playing a role with his symptoms.  Of asked the patient to follow-up with the [facility] clinic for primary medical care.  He should return to the ER for any difficulty breathing, worsening symptoms, not improving or concern.      FINAL IMPRESSION  1.  Chest pain, suspected gastroesophageal reflux  2.  Anxiety related to #1      This dictation was created using voice recognition software. The accuracy of the dictation is limited to the abilities of the software.  The nursing notes were  reviewed and certain aspects of this information were incorporated into this note.      Electronically signed by: Magan Yu M.D., 7/18/2021 10:20 AM

## 2021-07-18 NOTE — ED NOTES
Patient is resting comfortably. Verbalized relief of pain, states chest pain free but still has pressure in his head.

## 2021-07-18 NOTE — ED NOTES
Plan of care explained to pt. piv established labs and covid swab sent.  Medicated per mar order. Allergies verified.

## 2021-07-18 NOTE — ED TRIAGE NOTES
"Chief Complaint   Patient presents with   • Anxiety     Pt was seen in this ER yesterday for chest pain believing he was having a heart attack, pt was dc'd with dx of panic attack. Pt returns today for same sx stating his dc paperwork told him to return for sx of chest pain or diaphoresis. NAD at triage, VSS on RA.    • Chest Pain     X 2 days, tightness, constant, 'worse when I swallow like I'm choking', L side of chest non-radiating, denies cardiac hx, negative cardiac workup yesterday in this ER.      Pt ambulatory to triage for above complaints, VSs on RA, GCS 15, NAD.    Denies all s/sx of covid, denies recent travel, denies fevers.    Pt returned to [facility]. Educated on triage process and to inform staff of any changes.     /98   Pulse 89   Temp 36.7 °C (98.1 °F) (Temporal)   Resp 20   Ht 1.905 m (6' 3\")   Wt 58.9 kg (129 lb 13.6 oz)   SpO2 100%   BMI 16.23 kg/m²      "

## 2021-07-18 NOTE — ED TRIAGE NOTES
Aristidestea Phelps Jorge Luis  31 y.o. male  Chief Complaint   Patient presents with   • Muscle Spasms     Left pectoral tightness   • Numbness     radiating from spine down both legs   • Panic Attack     onset 1800   • Pressure Behind the Eyes   • Anxiety     panic       Pt BIB EMS for above complaint. Patient reports onset of all symptoms began after panic attack. Patient smoked marijuana previous to panic attack and is not a habitual user.  Patient reports having only had 3 hours of sleep in the past day and was drinking last night.     Pt is alert and oriented, speaking in full sentences, follows commands and responds appropriately to questions. Resp are even and unlabored. No behavioral indicators of pain.     This RN masked and in appropriate PPE during encounter.     Vitals:    07/17/21 2108   BP: (!) 167/104   Pulse: 77   Resp: 18   Temp: 36.8 °C (98.2 °F)   SpO2: 100%     
98

## 2021-07-18 NOTE — ED NOTES
Discharge instructions given to pt including follow up with pcp or returning if no improvement of symptoms or to return if worse. Prescription x11 provided to pt. Questions answered by RN. Denies any new complaints. Discharged w/stable vitals and able to ambulate to the lobby w/steady gait. Chest pain free. Skin pwd. NAD.

## 2023-05-23 NOTE — PROGRESS NOTES
HPI:   Presents for suture removal 10 days post procedure. Laceration without pain, discharge or redness.     ROS:   no fever, no sensory loss, no weakness    Exam:   Gen: WDWN in no distress  Wound: well healing laceration. 6 interrupted sutures removed without difficulty. No evidence of dehiscence or infection.  Neuro: sensory/motor intact     A/P   Laceration  Suture Removal  F/U prn     Specialty Pharmacy - Refill Coordination    Specialty Medication Orders Linked to Encounter      Flowsheet Row Most Recent Value   Medication #1 secukinumab (COSENTYX PEN) 150 mg/mL PnIj (Order#[id number], Rx#[id number])            Refill Questions - Documented Responses      Flowsheet Row Most Recent Value   Patient Availability and HIPAA Verification    Does patient want to proceed with activity? Yes   HIPAA/medical authority confirmed? Yes   Relationship to patient of person spoken to? Self   Refill Screening Questions    Changes to allergies? No   Changes to medications? No   New conditions since last clinic visit? No   Unplanned office visit, urgent care, ED, or hospital admission in the last 4 weeks? No   How does patient/caregiver feel medication is working? Good   Financial problems or insurance changes? No   How many doses of your specialty medications were missed in the last 4 weeks? 1   Why were doses missed? Other (comment)  [Refill call did not populate correctly.]   Would patient like to speak to a pharmacist? No   When does the patient need to receive the medication? 05/25/23   Refill Delivery Questions    How will the patient receive the medication? MEDRx   When does the patient need to receive the medication? 05/25/23   Shipping Address Home   Address in [facility] confirmed and updated if neccessary? Yes   Expected Copay ($) 3   Is the patient able to afford the medication copay? Yes   Payment Method new CC added to file   Days supply of Refill 28   Supplies needed? No supplies needed   Refill activity completed? Yes   Refill activity plan Refill scheduled   Shipment/Pickup Date: 05/24/23            Current Outpatient Medications   Medication Sig    aloe vera 5,000 mg Cap Take by mouth.    aspirin (ECOTRIN) 81 MG EC tablet Take 81 mg by mouth once daily.     aspirin 325 MG tablet Take 325 mg by mouth.    calcium carbonate (OS-LUCIANO) 600 mg calcium (1,500 mg) Tab Take 600 mg by mouth once  daily.    cetirizine (ZYRTEC) 10 MG tablet Take 10 mg by mouth daily as needed.    cholecalciferol, vitamin D3, 125 mcg (5,000 unit) Tab Take by mouth.    cyclobenzaprine (FLEXERIL) 10 MG tablet Take 1 tablet (10 mg total) by mouth 3 (three) times daily as needed for Muscle spasms.    cycloSPORINE (RESTASIS) 0.05 % ophthalmic emulsion     diclofenac sodium (VOLTAREN) 1 % Gel Apply 2 g topically.    dicyclomine (BENTYL) 20 mg tablet Take 1 tablet (20 mg total) by mouth 4 (four) times daily.    docusate sodium (COLACE) 100 MG capsule Take 100 mg by mouth.    fluconazole (DIFLUCAN) 150 MG Tab Take 150 mg by mouth once.    folic acid-vit B6-vit B12 (FOLBEE) 2.5-25-1 mg Tab Take 1 tablet by mouth once daily.    glipiZIDE (GLUCOTROL) 5 MG tablet Take 5 mg by mouth.    guaiFENesin (MUCINEX) 600 mg 12 hr tablet Take 1,200 mg by mouth.    HYDROcodone-acetaminophen (NORCO)  mg per tablet Take 1 tablet by mouth 2 (two) times a day.    HYDROcodone-acetaminophen (NORCO)  mg per tablet Take 1 tablet by mouth every 8 (eight) hours as needed for Pain.    HYDROcodone-acetaminophen (NORCO)  mg per tablet Take 1 tablet by mouth every 8 (eight) hours as needed for Pain.    hydrOXYchloroQUINE (PLAQUENIL) 200 mg tablet TAKE ONE TABLET BY MOUTH ONCE DAILY    lisinopril-hydrochlorothiazide (PRINZIDE,ZESTORETIC) 10-12.5 mg per tablet Take 1 tablet by mouth.    LORazepam (ATIVAN) 0.5 MG tablet Take 0.5 mg by mouth.    miscellaneous medical supply (C-TUB) Misc Glucometer and strips#100    multivitamin (THERAGRAN) per tablet Take 1 tablet by mouth.    nystatin (MYCOSTATIN) 100,000 unit/mL suspension     ondansetron (ZOFRAN-ODT) 4 MG TbDL Take 4 mg by mouth every 8 (eight) hours as needed.     pantoprazole (PROTONIX) 40 MG tablet Take 1 tablet (40 mg total) by mouth once daily.    paroxetine (PAXIL) 10 MG tablet Take 1 tablet (10 mg total) by mouth every evening.    polyethylene glycol (GLYCOLAX) 17 gram PwPk Take 17 g by  mouth once daily.    pravastatin (PRAVACHOL) 40 MG tablet Take 40 mg by mouth.    predniSONE (DELTASONE) 1 MG tablet TAKE 3 TABLETS BY MOUTH ONCE DAILY.    predniSONE (DELTASONE) 1 MG tablet Take 3 tablets (3 mg total) by mouth daily as needed (arthritis flare).    pregabalin (LYRICA) 25 MG capsule TAKE ONE CAPSULE BY MOUTH THREE TIMES DAILY    secukinumab (COSENTYX PEN) 150 mg/mL PnIj Inject 150 mg into the skin every 28 days.    traZODone (DESYREL) 100 MG tablet TAKE 1 TABLET BY MOUTH NIGHTLY    triamcinolone acetonide 0.1% (KENALOG) 0.1 % ointment Apply topically 2 (two) times daily.    valACYclovir (VALTREX) 1000 MG tablet TAKE 1 TABLET BY MOUTH THREE TIMES DAILY    zinc gluconate 50 mg tablet Take 50 mg by mouth.   Last reviewed on 3/7/2023 10:23 AM by Angelina Conteh, Igor    Review of patient's allergies indicates:   Allergen Reactions    Cymbalta [duloxetine] Other (See Comments)     Altered mental status    Last reviewed on  5/22/2023 2:50 PM by Almaz Carmen      Tasks added this encounter   No tasks added.   Tasks due within next 3 months   No tasks due.     Maritza Alvarado, PharmD  [facility]  [address] [place], [place] [address]  [facility] [address]  Phone: [phone]  Fax: [phone]

## 2023-08-17 NOTE — DISCHARGE SUMMARY
Chief Complaint   Patient presents with    Blood Draw     Labs collected from venipuncture by RN. Vitals taken. Checked in for appointment(s).      Ibeth Gunderson RN     Discharge Summary    CHIEF COMPLAINT ON ADMISSION  Chief Complaint   Patient presents with   • Dental Pain     right lower   • Facial Swelling     right       Reason for Admission  Month Pain     Admission Date  6/8/2020    CODE STATUS  Full Code    HPI & HOSPITAL COURSE  This is a 30 y.o. male here with   previous medical history that includes tobacco abuse.  Patient reports that about 3 days ago he started noticed pain in the lower portion of his right jaw towards the back by the angle.  He was seen at urgent care yesterday and given a prescription for clindamycin which he failed and started to take.  This morning when he woke up the pain was greater, and it has gotten more swollen.  Now he notes swelling about prison down his neck and tenderness all the way down to his clavicle.  He has not had any fever or chills, he is having no shortness of breath or difficulty swallowing.     Here in the ED a CT scan has been done which demonstrates an abscess at the angle of the jaw, there are also some dental caries.   He was admitted and was placed on iv antibiotic,oral surgery saw the pt and he was taken to O.R and had Surgical Extraction of teeth # 30, 31 & 32              Alveoloplasty (1-3 teeth ):  LR quadrant              Complicated Intraoral I&D.  He is doing very well and is able to eat and his leukocytosis has resolved.  He will be discharged home with augmentin 875mg bid for 7 days.       Therefore, he is discharged in good and stable condition to home with close outpatient follow-up.    The patient met 2-midnight criteria for an inpatient stay at the time of discharge.    Discharge Date  6/10/20    FOLLOW UP ITEMS POST DISCHARGE  pcp next week    DISCHARGE DIAGNOSES  Active Problems:    Oral abscess POA: Unknown    Tobacco abuse POA: Unknown  Resolved Problems:    * No resolved hospital problems. *      FOLLOW UP  No future appointments.  No follow-up provider specified.    MEDICATIONS ON DISCHARGE      Medication List      START taking these medications      Instructions   amoxicillin-clavulanate 875-125 MG Tabs  Commonly known as:  AUGMENTIN   Take 1 Tab by mouth 2 times a day.  Dose:  1 Tab        CONTINUE taking these medications      Instructions   HYDROcodone-acetaminophen 5-325 MG Tabs per tablet  Commonly known as:  NORCO   Take 1 Tab by mouth every 6 hours as needed for up to 3 days.  Dose:  1 Tab        STOP taking these medications    clindamycin 300 MG Caps  Commonly known as:  CLEOCIN            Allergies  No Known Allergies    DIET  Orders Placed This Encounter   Procedures   • Diet Order Low Fiber(GI Soft)     Standing Status:   Standing     Number of Occurrences:   1     Order Specific Question:   Diet:     Answer:   Low Fiber(GI Soft) [2]       ACTIVITY  As tolerated.  Weight bearing as tolerated    CONSULTATIONS  Oral surgery    PROCEDURES   Surgical Extraction of teeth # 30, 31 & 32              Alveoloplasty (1-3 teeth ):  LR quadrant              Complicated Intraoral I&D    LABORATORY  Lab Results   Component Value Date    SODIUM 140 06/10/2020    POTASSIUM 3.8 06/10/2020    CHLORIDE 102 06/10/2020    CO2 26 06/10/2020    GLUCOSE 103 (H) 06/10/2020    BUN 11 06/10/2020    CREATININE 0.72 06/10/2020        Lab Results   Component Value Date    WBC 6.4 06/10/2020    HEMOGLOBIN 13.2 (L) 06/10/2020    HEMATOCRIT 38.0 (L) 06/10/2020    PLATELETCT 220 06/10/2020        Total time of the discharge process exceeds 35 minutes.

## 2023-12-27 ENCOUNTER — HOSPITAL ENCOUNTER (EMERGENCY)
Facility: MEDICAL CENTER | Age: 33
End: 2023-12-27
Attending: EMERGENCY MEDICINE
Payer: COMMERCIAL

## 2023-12-27 VITALS
DIASTOLIC BLOOD PRESSURE: 87 MMHG | WEIGHT: 148.59 LBS | HEART RATE: 89 BPM | RESPIRATION RATE: 18 BRPM | OXYGEN SATURATION: 99 % | TEMPERATURE: 98.1 F | SYSTOLIC BLOOD PRESSURE: 122 MMHG | BODY MASS INDEX: 18.57 KG/M2

## 2023-12-27 DIAGNOSIS — J02.9 ACUTE VIRAL PHARYNGITIS: ICD-10-CM

## 2023-12-27 DIAGNOSIS — H10.33 ACUTE BACTERIAL CONJUNCTIVITIS OF BOTH EYES: Primary | ICD-10-CM

## 2023-12-27 DIAGNOSIS — B34.9 ACUTE VIRAL SYNDROME: ICD-10-CM

## 2023-12-27 PROCEDURE — 700101 HCHG RX REV CODE 250: Performed by: EMERGENCY MEDICINE

## 2023-12-27 PROCEDURE — 99283 EMERGENCY DEPT VISIT LOW MDM: CPT

## 2023-12-27 RX ORDER — POLYMYXIN B SULFATE AND TRIMETHOPRIM 1; 10000 MG/ML; [USP'U]/ML
1 SOLUTION OPHTHALMIC ONCE
Status: COMPLETED | OUTPATIENT
Start: 2023-12-27 | End: 2023-12-27

## 2023-12-27 RX ORDER — PROPARACAINE HYDROCHLORIDE 5 MG/ML
1 SOLUTION/ DROPS OPHTHALMIC ONCE
Status: COMPLETED | OUTPATIENT
Start: 2023-12-27 | End: 2023-12-27

## 2023-12-27 RX ADMIN — PROPARACAINE HYDROCHLORIDE 1 DROP: 5 SOLUTION/ DROPS OPHTHALMIC at 19:11

## 2023-12-27 RX ADMIN — POLYMYXIN B SULFATE AND TRIMETHOPRIM 1 DROP: 10000; 1 SOLUTION OPHTHALMIC at 19:34

## 2023-12-27 RX ADMIN — FLUORESCEIN SODIUM 1 MG: 1 STRIP OPHTHALMIC at 19:10

## 2023-12-27 ASSESSMENT — LIFESTYLE VARIABLES
DO YOU DRINK ALCOHOL: NO
HAVE YOU EVER FELT YOU SHOULD CUT DOWN ON YOUR DRINKING: NO
EVER FELT BAD OR GUILTY ABOUT YOUR DRINKING: NO
EVER HAD A DRINK FIRST THING IN THE MORNING TO STEADY YOUR NERVES TO GET RID OF A HANGOVER: NO
TOTAL SCORE: 0
AVERAGE NUMBER OF DAYS PER WEEK YOU HAVE A DRINK CONTAINING ALCOHOL: 0
TOTAL SCORE: 0
HAVE PEOPLE ANNOYED YOU BY CRITICIZING YOUR DRINKING: NO
HOW MANY TIMES IN THE PAST YEAR HAVE YOU HAD 5 OR MORE DRINKS IN A DAY: 0
CONSUMPTION TOTAL: NEGATIVE
TOTAL SCORE: 0
ON A TYPICAL DAY WHEN YOU DRINK ALCOHOL HOW MANY DRINKS DO YOU HAVE: 0

## 2023-12-28 NOTE — DISCHARGE INSTRUCTIONS
You can take the bottle of medication for eyedrops home with you.  Please use 1 to 2 drops in each eye every 4 hours while awake for the next 5 days.  He can take Tylenol or Motrin to help with any other symptoms such as her scratchy sore throat.  Follow-up with your primary team for further evaluation as needed.  Return to the ED if you have worsening symptoms.  Thank you for coming in today.

## 2023-12-28 NOTE — ED NOTES
Discharge teaching and paperwork provided regarding acute bacterial conjunctivitis and all questions/concerns answered. VSS,  assessment stable. Given information regarding home care and reasons to follow up with ED or primary MD. Patient provided  Rx. Patient discharged to the care of friend  and ambulated out of the ED.

## 2023-12-28 NOTE — ED PROVIDER NOTES
"ED Provider Note    Scribed for Walt Reeves by Asia Daniel. 12/27/2023  6:46 PM    Primary care provider: Pcp Pt States None  Means of arrival: Walk In  History obtained from: Patient  History limited by: None    CHIEF COMPLAINT  Chief Complaint   Patient presents with    Red Eye    Sore Throat    Eye Drainage     EXTERNAL RECORDS REVIEWED  Other None    HPI/ROS  LIMITATION TO HISTORY   Select: : None    HPI  Aristides Kang is a 33 y.o. male who presents to the Emergency Department left eye redness. Patient states that a copper wire possible puncture his left eye a few days ago. 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 He wears safety glasses while at work. Denies pain to either eye but adds that it feels scratchy in the corners. Last night, the patient's right eye became red. When he wakes up, the patient \"feels like his eyes are glued shut\". This morning, he developed pain in the center of his throat when swallowing with an occasional cough. Denies nausea, vomiting, abdominal pain or body aches. Patient smokes cigarettes and uses alcohol occasionally.     REVIEW OF SYSTEMS  As above, all other systems reviewed and are negative.   See HPI for further details.     PAST MEDICAL HISTORY     SURGICAL HISTORY   has a past surgical history that includes submandible abscess incision and drainage (6/8/2020).  SOCIAL HISTORY  Social History     Tobacco Use    Smoking status: Every Day     Current packs/day: 0.50     Average packs/day: 0.5 packs/day for 4.0 years (2.0 ttl pk-yrs)     Types: Cigarettes    Smokeless tobacco: Never   Vaping Use    Vaping Use: Former   Substance Use Topics    Alcohol use: Yes     Comment: occ    Drug use: No      Social History     Substance and Sexual Activity   Drug Use No     FAMILY HISTORY  No family history noted.    CURRENT MEDICATIONS  Home Medications       Reviewed by Susan York R.N. (Registered Nurse) on 12/27/23 at 1737  " Med List Status: Partial     Medication Last Dose Status   amoxicillin-clavulanate (AUGMENTIN) 875-125 MG Tab  Active   omeprazole (PRILOSEC) 20 MG delayed-release capsule  Active                  ALLERGIES  No Known Allergies    PHYSICAL EXAM    VITAL SIGNS:   Vitals:    12/27/23 1731 12/27/23 1737   BP: 131/87    Pulse: (!) 104    Resp: 16    Temp: 36.4 °C (97.5 °F)    TempSrc: Temporal    SpO2: 99%    Weight:  67.4 kg (148 lb 9.4 oz)     Vitals: My interpretation: normotensive, tachycardic, afebrile, not hypoxic    Reinterpretation of vitals: Unchanged and unremarkable    PE:   Gen: sitting comfortably, speaking clearly, appears in no acute distress   ENT: Mucous membranes moist, posterior pharynx clear, uvula midline, nares patent bilaterally   Neck: Supple, FROM  Pulmonary: Lungs are clear to auscultation bilaterally. No tachypnea  CV:  RRR, no murmur appreciated, pulses 2+ in both upper and lower extremities  Abdomen: soft, NT/ND; no rebound/guarding  : no CVA or suprapubic tenderness   Neuro: A&Ox4 (person, place, time, situation), speech fluent, gait steady, no focal deficits appreciated  Skin: No rash or lesions.  No pallor or jaundice.  No cyanosis.  Warm and dry.   Eye exam: Visual acuity intact bilaterally, does not wear contacts, no glasses, no pain with extraocular movements of the eyes, pupils are equal round and reactive bilaterally, no pain with introduction of light, there is scleral injection on the right eye, fluorescein stain applied, no uptake or Estelle sign, no periorbital swelling or tenderness around the eye    DIAGNOSTIC STUDIES / PROCEDURES    COURSE & MEDICAL DECISION MAKING  Nursing notes, VS, PMSFHx, labs, imaging, EKG reviewed in chart.    ED Observation Status? No; Patient does not meet criteria for ED Observation.     Ddx: Conjunctivitis, bacterial conjunctivitis, viral conjunctivitis, open globe injury, corneal abrasion    MDM: 6:46 PM Aristides Kang is a 33 y.o. male who  "presented with onset of mild bilateral eye irritation and scratchiness as well as waking up with his eyes \"glued shut\" this morning.  Noted that there is redness to the right eye.  Likely unrelated, patient states that a few days ago he did have a wire accidentally hit his left eye which is not painful and is unaffected currently but wanted to have it evaluated.  He is healthy and takes no medications.  Upon arrival here his vital signs show borderline tachycardia but otherwise unremarkable vital signs.  His exam shows that he has intact visual acuity.  He does not wear contacts or glasses.  He has no pain with extraocular movements of the eyes and pupils are equal round and reactive bilaterally, without pain to introduction of light.  He has some mild scleral injection of the right eye.  Fluorescein and proparacaine applied, there is no uptake of fluorescein or corneal abrasions and that he has a negative Estelle sign to both eyes.  There is no periorbital swelling tenderness or warmth around the eye.  Overall patient's symptoms are consistent with likely viral versus bacterial conjunctivitis and will treat empirically with Polytrim eyedrops, started in the ED and patient given the bottle to take home for every 4 hour application both eyes.  Return precautions were discussed.  Patient does not have a PCP so referral was placed in [facility] for the patient to be seen.  Recommended returning if any symptoms worsen or not improve and patient verbalized understanding and is amenable.    ADDITIONAL PROBLEM LIST AND DISPOSITION    I have discussed management of the patient with the following physicians and GERMANIA's:  none    Discussion of management with other QHP or appropriate source(s): None     Escalation of care considered, and ultimately not performed:IV fluids, blood analysis, and diagnostic imaging    Barriers to care at this time, including but not limited to: Patient does not have established PCP.     Decision tools and " prescription drugs considered including, but not limited to: Antibiotics Polytrim antibiotics .    FINAL IMPRESSION  1. Acute bacterial conjunctivitis of both eyes Acute   2. Acute viral syndrome Acute   3. Acute viral pharyngitis Acute       Asia SNOWDEN (Scribe), am scribing for, and in the presence of, Walt Reeves.    Electronically signed by: Asia Daniel (Scribe), 12/27/2023    IWalt personally performed the services described in this documentation, as scribed by Asia Daniel in my presence, and it is both accurate and complete.    The note accurately reflects work and decisions made by me.  Walt Reeves  12/27/2023  7:16 PM

## 2023-12-28 NOTE — ED TRIAGE NOTES
"Pt comes in complaining of a red R eye and a sore a throat. Pt stating a copper wire \"went throw my left eye a couple days ago\"  "

## 2024-01-12 ENCOUNTER — TELEPHONE (OUTPATIENT)
Dept: HEALTH INFORMATION MANAGEMENT | Facility: OTHER | Age: 34
End: 2024-01-12
Payer: COMMERCIAL

## (undated) DEVICE — TUBE E-T HI-LO CUFF 8.0MM (10EA/PK)

## (undated) DEVICE — MASK ANESTHESIA ADULT  - (100/CA)

## (undated) DEVICE — ELECTRODE 850 FOAM ADHESIVE - HYDROGEL RADIOTRNSPRNT (50/PK)

## (undated) DEVICE — GLOVE BIOGEL PI INDICATOR SZ 6.5 SURGICAL PF LF - (50/BX 4BX/CA)

## (undated) DEVICE — DRAPE LARGE 3 QUARTER - (20/CA)

## (undated) DEVICE — SET LEADWIRE 5 LEAD BEDSIDE DISPOSABLE ECG (1SET OF 5/EA)

## (undated) DEVICE — SENSOR SPO2 NEO LNCS ADHESIVE (20/BX) SEE USER NOTES

## (undated) DEVICE — SUCTION INSTRUMENT YANKAUER BULBOUS TIP W/O VENT (50EA/CA)

## (undated) DEVICE — TUBING CLEARLINK DUO-VENT - C-FLO (48EA/CA)

## (undated) DEVICE — DRAPE SURGICAL U 77X120 - (10/CA)

## (undated) DEVICE — KIT ANESTHESIA W/CIRCUIT & 3/LT BAG W/FILTER (20EA/CA)

## (undated) DEVICE — SET EXTENSION WITH 2 PORTS (48EA/CA) ***PART #2C8610 IS A SUBSTITUTE*****

## (undated) DEVICE — HEAD HOLDER JUNIOR/ADULT

## (undated) DEVICE — GLOVE SZ 6 BIOGEL PI MICRO - PF LF (50PR/BX 4BX/CA)

## (undated) DEVICE — LACTATED RINGERS INJ 1000 ML - (14EA/CA 60CA/PF)

## (undated) DEVICE — TOWELS CLOTH SURGICAL - (4/PK 20PK/CA)

## (undated) DEVICE — GOWN SURGEONS X-LARGE - DISP. (30/CA)

## (undated) DEVICE — PROTECTOR ULNA NERVE - (36PR/CA)

## (undated) DEVICE — ELECTRODE DUAL RETURN W/ CORD - (50/PK)

## (undated) DEVICE — CANISTER SUCTION 3000ML MECHANICAL FILTER AUTO SHUTOFF MEDI-VAC NONSTERILE LF DISP  (40EA/CA)

## (undated) DEVICE — NEPTUNE 4 PORT MANIFOLD - (20/PK)

## (undated) DEVICE — PACK MINOR BASIN - (2EA/CA)

## (undated) DEVICE — SODIUM CHL IRRIGATION 0.9% 1000ML (12EA/CA)

## (undated) DEVICE — GLOVE BIOGEL ECLIPSE PF LATEX SIZE 7.5